# Patient Record
Sex: FEMALE | Race: WHITE | NOT HISPANIC OR LATINO | Employment: FULL TIME | URBAN - METROPOLITAN AREA
[De-identification: names, ages, dates, MRNs, and addresses within clinical notes are randomized per-mention and may not be internally consistent; named-entity substitution may affect disease eponyms.]

---

## 2017-08-18 ENCOUNTER — TELEPHONE (OUTPATIENT)
Dept: GENETICS | Facility: HOSPITAL | Age: 27
End: 2017-08-18

## 2017-10-01 ENCOUNTER — APPOINTMENT (OUTPATIENT)
Dept: GENERAL RADIOLOGY | Facility: HOSPITAL | Age: 27
End: 2017-10-01

## 2017-10-01 ENCOUNTER — HOSPITAL ENCOUNTER (OUTPATIENT)
Facility: HOSPITAL | Age: 27
Setting detail: OBSERVATION
Discharge: HOME | End: 2017-10-03
Attending: EMERGENCY MEDICINE | Admitting: INTERNAL MEDICINE

## 2017-10-01 DIAGNOSIS — E83.39 HYPOPHOSPHATEMIA: ICD-10-CM

## 2017-10-01 DIAGNOSIS — E86.0 DEHYDRATION: Primary | ICD-10-CM

## 2017-10-01 DIAGNOSIS — E87.6 HYPOKALEMIA: ICD-10-CM

## 2017-10-01 DIAGNOSIS — R00.0 TACHYCARDIA: ICD-10-CM

## 2017-10-01 DIAGNOSIS — I95.9 HYPOTENSION, UNSPECIFIED HYPOTENSION TYPE: ICD-10-CM

## 2017-10-01 DIAGNOSIS — E83.51 HYPOCALCEMIA: ICD-10-CM

## 2017-10-01 DIAGNOSIS — E77.8 HYPOPROTEINEMIA (HCC): ICD-10-CM

## 2017-10-01 PROBLEM — R11.10 VOMITING: Status: ACTIVE | Noted: 2017-10-01

## 2017-10-01 PROBLEM — E87.8 ELECTROLYTE ABNORMALITY: Status: ACTIVE | Noted: 2017-10-01

## 2017-10-01 LAB
ALBUMIN SERPL-MCNC: 1.7 G/DL (ref 3.2–4.8)
ALBUMIN/GLOB SERPL: 1.5 G/DL (ref 1.5–2.5)
ALP SERPL-CCNC: 24 U/L (ref 25–100)
ALT SERPL W P-5'-P-CCNC: 6 U/L (ref 7–40)
ANION GAP SERPL CALCULATED.3IONS-SCNC: 7 MMOL/L (ref 3–11)
AST SERPL-CCNC: 15 U/L (ref 0–33)
BACTERIA UR QL AUTO: ABNORMAL /HPF
BASOPHILS # BLD AUTO: 0.02 10*3/MM3 (ref 0–0.2)
BASOPHILS NFR BLD AUTO: 0.2 % (ref 0–1)
BILIRUB SERPL-MCNC: 0.2 MG/DL (ref 0.3–1.2)
BILIRUB UR QL STRIP: ABNORMAL
BUN BLD-MCNC: 13 MG/DL (ref 9–23)
BUN BLDA-MCNC: 13 MG/DL (ref 8–26)
BUN/CREAT SERPL: 32.5 (ref 7–25)
CA-I BLDA-SCNC: 0.93 MMOL/L (ref 1.2–1.32)
CA-I SERPL ISE-MCNC: 1.07 MMOL/L (ref 1.12–1.32)
CALCIUM SPEC-SCNC: 6.6 MG/DL (ref 8.7–10.4)
CHLORIDE BLDA-SCNC: 100 MMOL/L (ref 98–109)
CHLORIDE SERPL-SCNC: 107 MMOL/L (ref 99–109)
CLARITY UR: ABNORMAL
CO2 BLDA-SCNC: 21 MMOL/L (ref 24–29)
CO2 SERPL-SCNC: 23 MMOL/L (ref 20–31)
COLOR UR: YELLOW
CREAT BLD-MCNC: 0.4 MG/DL (ref 0.6–1.3)
CREAT BLDA-MCNC: 0.5 MG/DL (ref 0.6–1.3)
CRP SERPL-MCNC: 0.45 MG/DL (ref 0–1)
D-LACTATE SERPL-SCNC: 1.3 MMOL/L (ref 0.5–2)
DEPRECATED RDW RBC AUTO: 35.1 FL (ref 37–54)
EOSINOPHIL # BLD AUTO: 0.67 10*3/MM3 (ref 0–0.3)
EOSINOPHIL NFR BLD AUTO: 8.2 % (ref 0–3)
ERYTHROCYTE [DISTWIDTH] IN BLOOD BY AUTOMATED COUNT: 12.1 % (ref 11.3–14.5)
FOLATE SERPL-MCNC: 7.08 NG/ML (ref 3.2–20)
GFR SERPL CREATININE-BSD FRML MDRD: >150 ML/MIN/1.73
GLOBULIN UR ELPH-MCNC: 1.1 GM/DL
GLUCOSE BLD-MCNC: 108 MG/DL (ref 70–100)
GLUCOSE BLDC GLUCOMTR-MCNC: 104 MG/DL (ref 70–130)
GLUCOSE UR STRIP-MCNC: NEGATIVE MG/DL
HCT VFR BLD AUTO: 46.1 % (ref 34.5–44)
HCT VFR BLDA CALC: 50 % (ref 38–51)
HGB BLD-MCNC: 15.3 G/DL (ref 11.5–15.5)
HGB BLDA-MCNC: 17 G/DL (ref 12–17)
HGB UR QL STRIP.AUTO: NEGATIVE
HOLD SPECIMEN: NORMAL
HYALINE CASTS UR QL AUTO: ABNORMAL /LPF
IMM GRANULOCYTES # BLD: 0.05 10*3/MM3 (ref 0–0.03)
IMM GRANULOCYTES NFR BLD: 0.6 % (ref 0–0.6)
IRON 24H UR-MRATE: 81 MCG/DL (ref 50–175)
IRON SATN MFR SERPL: 62 % (ref 15–50)
KETONES UR QL STRIP: ABNORMAL
LEUKOCYTE ESTERASE UR QL STRIP.AUTO: NEGATIVE
LIPASE SERPL-CCNC: 43 U/L (ref 6–51)
LYMPHOCYTES # BLD AUTO: 1.88 10*3/MM3 (ref 0.6–4.8)
LYMPHOCYTES NFR BLD AUTO: 23.1 % (ref 24–44)
MAGNESIUM SERPL-MCNC: 1.3 MG/DL (ref 1.3–2.7)
MCH RBC QN AUTO: 26.6 PG (ref 27–31)
MCHC RBC AUTO-ENTMCNC: 33.2 G/DL (ref 32–36)
MCV RBC AUTO: 80 FL (ref 80–99)
MONOCYTES # BLD AUTO: 0.7 10*3/MM3 (ref 0–1)
MONOCYTES NFR BLD AUTO: 8.6 % (ref 0–12)
NEUTROPHILS # BLD AUTO: 4.81 10*3/MM3 (ref 1.5–8.3)
NEUTROPHILS NFR BLD AUTO: 59.3 % (ref 41–71)
NITRITE UR QL STRIP: NEGATIVE
PH UR STRIP.AUTO: 6 [PH] (ref 5–8)
PHOSPHATE SERPL-MCNC: 1.6 MG/DL (ref 2.4–5.1)
PLATELET # BLD AUTO: 299 10*3/MM3 (ref 150–450)
PMV BLD AUTO: 9.7 FL (ref 6–12)
POTASSIUM BLD-SCNC: 3.1 MMOL/L (ref 3.5–5.5)
POTASSIUM BLDA-SCNC: 2.9 MMOL/L (ref 3.5–4.9)
PROT SERPL-MCNC: 2.8 G/DL (ref 5.7–8.2)
PROT UR QL STRIP: NEGATIVE
RBC # BLD AUTO: 5.76 10*6/MM3 (ref 3.89–5.14)
RBC # UR: ABNORMAL /HPF
REF LAB TEST METHOD: ABNORMAL
SODIUM BLD-SCNC: 137 MMOL/L (ref 132–146)
SODIUM BLDA-SCNC: 141 MMOL/L (ref 138–146)
SP GR UR STRIP: 1.02 (ref 1–1.03)
SQUAMOUS #/AREA URNS HPF: ABNORMAL /HPF
TIBC SERPL-MCNC: 131 MCG/DL (ref 250–450)
TROPONIN I SERPL-MCNC: 0.04 NG/ML (ref 0–0.07)
TSH SERPL DL<=0.05 MIU/L-ACNC: 2.05 MIU/ML (ref 0.35–5.35)
UROBILINOGEN UR QL STRIP: ABNORMAL
VIT B12 BLD-MCNC: 409 PG/ML (ref 211–911)
WBC NRBC COR # BLD: 8.13 10*3/MM3 (ref 3.5–10.8)
WBC UR QL AUTO: ABNORMAL /HPF
WHOLE BLOOD HOLD SPECIMEN: NORMAL
WHOLE BLOOD HOLD SPECIMEN: NORMAL

## 2017-10-01 PROCEDURE — 96375 TX/PRO/DX INJ NEW DRUG ADDON: CPT

## 2017-10-01 PROCEDURE — 83605 ASSAY OF LACTIC ACID: CPT | Performed by: EMERGENCY MEDICINE

## 2017-10-01 PROCEDURE — 25010000003 POTASSIUM CHLORIDE 10 MEQ/100ML SOLUTION: Performed by: EMERGENCY MEDICINE

## 2017-10-01 PROCEDURE — 86140 C-REACTIVE PROTEIN: CPT | Performed by: EMERGENCY MEDICINE

## 2017-10-01 PROCEDURE — 25010000002 ONDANSETRON PER 1 MG: Performed by: EMERGENCY MEDICINE

## 2017-10-01 PROCEDURE — 25810000003 SODIUM CHLORIDE 0.9 % WITH KCL 20 MEQ 20-0.9 MEQ/L-% SOLUTION: Performed by: INTERNAL MEDICINE

## 2017-10-01 PROCEDURE — 87040 BLOOD CULTURE FOR BACTERIA: CPT | Performed by: EMERGENCY MEDICINE

## 2017-10-01 PROCEDURE — 83690 ASSAY OF LIPASE: CPT | Performed by: EMERGENCY MEDICINE

## 2017-10-01 PROCEDURE — 81001 URINALYSIS AUTO W/SCOPE: CPT | Performed by: EMERGENCY MEDICINE

## 2017-10-01 PROCEDURE — 85025 COMPLETE CBC W/AUTO DIFF WBC: CPT | Performed by: EMERGENCY MEDICINE

## 2017-10-01 PROCEDURE — 85014 HEMATOCRIT: CPT

## 2017-10-01 PROCEDURE — 99284 EMERGENCY DEPT VISIT MOD MDM: CPT

## 2017-10-01 PROCEDURE — G0378 HOSPITAL OBSERVATION PER HR: HCPCS

## 2017-10-01 PROCEDURE — 84484 ASSAY OF TROPONIN QUANT: CPT

## 2017-10-01 PROCEDURE — 96376 TX/PRO/DX INJ SAME DRUG ADON: CPT

## 2017-10-01 PROCEDURE — 25010000002 ONDANSETRON PER 1 MG: Performed by: INTERNAL MEDICINE

## 2017-10-01 PROCEDURE — 80047 BASIC METABLC PNL IONIZED CA: CPT

## 2017-10-01 PROCEDURE — 96365 THER/PROPH/DIAG IV INF INIT: CPT

## 2017-10-01 PROCEDURE — 99220 PR INITIAL OBSERVATION CARE/DAY 70 MINUTES: CPT | Performed by: INTERNAL MEDICINE

## 2017-10-01 PROCEDURE — 83540 ASSAY OF IRON: CPT | Performed by: INTERNAL MEDICINE

## 2017-10-01 PROCEDURE — 82746 ASSAY OF FOLIC ACID SERUM: CPT | Performed by: INTERNAL MEDICINE

## 2017-10-01 PROCEDURE — 80053 COMPREHEN METABOLIC PANEL: CPT | Performed by: EMERGENCY MEDICINE

## 2017-10-01 PROCEDURE — 82607 VITAMIN B-12: CPT | Performed by: INTERNAL MEDICINE

## 2017-10-01 PROCEDURE — 71010 HC CHEST PA OR AP: CPT

## 2017-10-01 PROCEDURE — 84100 ASSAY OF PHOSPHORUS: CPT | Performed by: EMERGENCY MEDICINE

## 2017-10-01 PROCEDURE — 84443 ASSAY THYROID STIM HORMONE: CPT | Performed by: INTERNAL MEDICINE

## 2017-10-01 PROCEDURE — 93005 ELECTROCARDIOGRAM TRACING: CPT | Performed by: EMERGENCY MEDICINE

## 2017-10-01 PROCEDURE — 83735 ASSAY OF MAGNESIUM: CPT | Performed by: EMERGENCY MEDICINE

## 2017-10-01 PROCEDURE — 82330 ASSAY OF CALCIUM: CPT | Performed by: EMERGENCY MEDICINE

## 2017-10-01 PROCEDURE — 83550 IRON BINDING TEST: CPT | Performed by: INTERNAL MEDICINE

## 2017-10-01 RX ORDER — POTASSIUM CHLORIDE 7.45 MG/ML
10 INJECTION INTRAVENOUS
Status: DISCONTINUED | OUTPATIENT
Start: 2017-10-01 | End: 2017-10-03 | Stop reason: HOSPADM

## 2017-10-01 RX ORDER — ACETAMINOPHEN 325 MG/1
650 TABLET ORAL EVERY 4 HOURS PRN
Status: DISCONTINUED | OUTPATIENT
Start: 2017-10-01 | End: 2017-10-03 | Stop reason: HOSPADM

## 2017-10-01 RX ORDER — FAMOTIDINE 10 MG/ML
20 INJECTION, SOLUTION INTRAVENOUS ONCE
Status: COMPLETED | OUTPATIENT
Start: 2017-10-01 | End: 2017-10-01

## 2017-10-01 RX ORDER — POTASSIUM CHLORIDE 750 MG/1
20 CAPSULE, EXTENDED RELEASE ORAL ONCE
Status: COMPLETED | OUTPATIENT
Start: 2017-10-01 | End: 2017-10-01

## 2017-10-01 RX ORDER — SODIUM CHLORIDE 0.9 % (FLUSH) 0.9 %
10 SYRINGE (ML) INJECTION AS NEEDED
Status: DISCONTINUED | OUTPATIENT
Start: 2017-10-01 | End: 2017-10-03 | Stop reason: HOSPADM

## 2017-10-01 RX ORDER — SODIUM CHLORIDE 0.9 % (FLUSH) 0.9 %
1-10 SYRINGE (ML) INJECTION AS NEEDED
Status: DISCONTINUED | OUTPATIENT
Start: 2017-10-01 | End: 2017-10-03 | Stop reason: HOSPADM

## 2017-10-01 RX ORDER — ONDANSETRON 2 MG/ML
INJECTION INTRAMUSCULAR; INTRAVENOUS
Status: DISPENSED
Start: 2017-10-01 | End: 2017-10-02

## 2017-10-01 RX ORDER — MAGNESIUM SULFATE HEPTAHYDRATE 40 MG/ML
4 INJECTION, SOLUTION INTRAVENOUS AS NEEDED
Status: DISCONTINUED | OUTPATIENT
Start: 2017-10-01 | End: 2017-10-03 | Stop reason: HOSPADM

## 2017-10-01 RX ORDER — ONDANSETRON 2 MG/ML
4 INJECTION INTRAMUSCULAR; INTRAVENOUS EVERY 6 HOURS PRN
Status: DISCONTINUED | OUTPATIENT
Start: 2017-10-01 | End: 2017-10-03 | Stop reason: HOSPADM

## 2017-10-01 RX ORDER — MAGNESIUM SULFATE HEPTAHYDRATE 40 MG/ML
2 INJECTION, SOLUTION INTRAVENOUS AS NEEDED
Status: DISCONTINUED | OUTPATIENT
Start: 2017-10-01 | End: 2017-10-03 | Stop reason: HOSPADM

## 2017-10-01 RX ORDER — POTASSIUM CHLORIDE 7.45 MG/ML
10 INJECTION INTRAVENOUS ONCE
Status: COMPLETED | OUTPATIENT
Start: 2017-10-01 | End: 2017-10-01

## 2017-10-01 RX ORDER — ONDANSETRON 2 MG/ML
4 INJECTION INTRAMUSCULAR; INTRAVENOUS ONCE
Status: COMPLETED | OUTPATIENT
Start: 2017-10-01 | End: 2017-10-01

## 2017-10-01 RX ORDER — POTASSIUM CHLORIDE 750 MG/1
40 CAPSULE, EXTENDED RELEASE ORAL AS NEEDED
Status: DISCONTINUED | OUTPATIENT
Start: 2017-10-01 | End: 2017-10-03 | Stop reason: HOSPADM

## 2017-10-01 RX ORDER — SODIUM CHLORIDE AND POTASSIUM CHLORIDE 150; 900 MG/100ML; MG/100ML
125 INJECTION, SOLUTION INTRAVENOUS CONTINUOUS
Status: DISCONTINUED | OUTPATIENT
Start: 2017-10-01 | End: 2017-10-03 | Stop reason: HOSPADM

## 2017-10-01 RX ORDER — SODIUM CHLORIDE 9 MG/ML
250 INJECTION, SOLUTION INTRAVENOUS CONTINUOUS
Status: DISCONTINUED | OUTPATIENT
Start: 2017-10-01 | End: 2017-10-02

## 2017-10-01 RX ORDER — POTASSIUM CHLORIDE 1.5 G/1.77G
40 POWDER, FOR SOLUTION ORAL AS NEEDED
Status: DISCONTINUED | OUTPATIENT
Start: 2017-10-01 | End: 2017-10-03 | Stop reason: HOSPADM

## 2017-10-01 RX ADMIN — POTASSIUM CHLORIDE 10 MEQ: 7.46 INJECTION, SOLUTION INTRAVENOUS at 18:54

## 2017-10-01 RX ADMIN — ONDANSETRON 4 MG: 2 INJECTION INTRAMUSCULAR; INTRAVENOUS at 23:06

## 2017-10-01 RX ADMIN — SODIUM CHLORIDE 250 ML/HR: 9 INJECTION, SOLUTION INTRAVENOUS at 19:27

## 2017-10-01 RX ADMIN — POTASSIUM CHLORIDE 20 MEQ: 750 CAPSULE, EXTENDED RELEASE ORAL at 21:04

## 2017-10-01 RX ADMIN — ONDANSETRON 4 MG: 2 INJECTION INTRAMUSCULAR; INTRAVENOUS at 18:44

## 2017-10-01 RX ADMIN — FAMOTIDINE 20 MG: 10 INJECTION, SOLUTION INTRAVENOUS at 18:47

## 2017-10-01 RX ADMIN — POTASSIUM CHLORIDE AND SODIUM CHLORIDE 125 ML/HR: 900; 150 INJECTION, SOLUTION INTRAVENOUS at 23:35

## 2017-10-01 RX ADMIN — SODIUM CHLORIDE 1000 ML: 9 INJECTION, SOLUTION INTRAVENOUS at 18:32

## 2017-10-01 RX ADMIN — SODIUM CHLORIDE 1000 ML: 9 INJECTION, SOLUTION INTRAVENOUS at 18:48

## 2017-10-01 NOTE — ED PROVIDER NOTES
Subjective   HPI Comments: Saritha Simental is a 27 y.o.female who presents to the ED with c/o generalized illness with onset 4 days ago. She reports experiencing dehydration, fatigue, decreased appetite and inability to drink fluids, rapid heart rate, dizziness when she stands up, diarrhea 4 days ago, nausea, vomiting, HA, fevers, weight loss, and tingling all over which onset 1 hours PTA.  She states that she was seen at the ED in the past couple of days for these symptoms where they found that her creatinine and BUN were elevated and she was given 3 L of fluid. The patient notes that she told the doctors at the ED that she felt better, but she didn't actually feel improved. She denies dysuria, increased urgency or frequency, cough, congestion, sore throat, rhinorrhea, consumption of bad food, or any other complaints at this time. Additionally, she denies any history of cardiac or lung problems, smoking, drinking, drug use, DM, or any known sick contacts. She see's Dr. Zamora for primary care.    Patient is a 27 y.o. female presenting with general illness.   History provided by:  Patient  Illness   Quality:  Dehydration, diarrhea, fatigue, dizziness, decreased appetite, nausea, vomiting, HA, fevers, weight loss, and tingling  Severity:  Moderate  Onset quality:  Sudden  Duration: 4 days.  Timing:  Constant  Progression:  Unchanged  Chronicity:  New  Context:  She states that she was seen at the ED in the past couple of days for these symptoms where they found that her creatinine and BUN were elevated and she was given 3 L of fluid. The patient notes that she told the doctors at the ED that she felt better, but  Relieved by:  Fluids  Worsened by:  Nothing  Ineffective treatments:  Nothing  Associated symptoms: diarrhea, fatigue, headaches, nausea and vomiting    Associated symptoms: no congestion, no cough, no rhinorrhea and no sore throat        Review of Systems   Constitutional: Positive for appetite change  "(decreased and inability to drink fluids), fatigue and unexpected weight change (Weight loss).        Dehydrated. \"Tingling\" all over.   HENT: Negative for congestion, rhinorrhea and sore throat.    Respiratory: Negative for cough.    Cardiovascular:        Rapid heart rate   Gastrointestinal: Positive for diarrhea, nausea and vomiting.   Genitourinary: Negative for dysuria, frequency and urgency.   Neurological: Positive for dizziness and headaches.   All other systems reviewed and are negative.      Past Medical History:   Diagnosis Date   • Abnormal Pap smear of cervix     \"A few years ago for HPV but went away\"   • Scoliosis    • Tethered spinal cord     Pt is unsure if she will be able to get an epidural       No Known Allergies    Past Surgical History:   Procedure Laterality Date   •  SECTION     • EAR TUBES Bilateral     \"When I was 6\"   • TONSILLECTOMY      \"When I was 6\"   • WISDOM TOOTH EXTRACTION         Family History   Problem Relation Age of Onset   • Nephrolithiasis Father    • Diabetes Father    • Hypertension Father        Social History     Social History   • Marital status:      Spouse name: N/A   • Number of children: N/A   • Years of education: N/A     Social History Main Topics   • Smoking status: Never Smoker   • Smokeless tobacco: Never Used   • Alcohol use No   • Drug use: No   • Sexual activity: Defer     Other Topics Concern   • None     Social History Narrative    Lives with her          Objective   Physical Exam   Constitutional: She is oriented to person, place, and time. She appears well-developed and well-nourished. No distress.   HENT:   Head: Normocephalic and atraumatic.   Nose: Nose normal.   Eyes: No scleral icterus.   Conjunctivae is slightly pallor.   Neck: Normal range of motion. Neck supple.   Cardiovascular: Regular rhythm and normal heart sounds.  Tachycardia present.  Exam reveals no gallop and no friction rub.    No murmur heard.  Pulmonary/Chest: " Effort normal and breath sounds normal. No respiratory distress.   Abdominal: Soft. Bowel sounds are normal. She exhibits no distension. There is no tenderness.   Negative murphys sign.   Musculoskeletal: Normal range of motion. She exhibits no edema.   Lymphadenopathy:     She has no cervical adenopathy.   Neurological: She is alert and oriented to person, place, and time.   Skin: Skin is warm and dry.   Psychiatric: She has a normal mood and affect. Her behavior is normal.   Nursing note and vitals reviewed.      Procedures         ED Course  ED Course   Comment By Time   Patient is serially reevaluated throughout the ED course.  She is treated with a 2 L bolus of IV fluids with market improvement of her heart rate with decreased to the 110 range and subsequently down into the 90s.  She remained hypotensive however and had repeated fluid.  She had marked electrolyte abnormalities, but additionally had a markedly depressed albumin and total protein.I discussed the findings with patient and spouse.  She is able take only sips of fluids, but is unable to do more than that even after vigorous hydration in the emergency departmentDiscussed case with Dr. Mckay will admit for definitive inpatient care Asaf Little MD 10/02 1166     Recent Results (from the past 24 hour(s))   POC CHEM 8    Collection Time: 10/01/17  6:30 PM   Result Value Ref Range    Glucose 104 70 - 130 mg/dL    BUN, Arterial 13 8 - 26 mg/dL    Creatinine 0.50 (L) 0.60 - 1.30 mg/dL    Sodium 141 138 - 146 mmol/L    Potassium 2.9 (L) 3.5 - 4.9 mmol/L    Chloride 100 98 - 109 mmol/L    Total CO2 21 (L) 24 - 29 mmol/L    Hemoglobin 17.0 12.0 - 17.0 g/dL    Hematocrit 50 38 - 51 %    Ionized Calcium 0.93 (L) 1.20 - 1.32 mmol/L   Comprehensive Metabolic Panel    Collection Time: 10/01/17  7:01 PM   Result Value Ref Range    Glucose 108 (H) 70 - 100 mg/dL    BUN 13 9 - 23 mg/dL    Creatinine 0.40 (L) 0.60 - 1.30 mg/dL    Sodium 137 132 - 146 mmol/L     Potassium 3.1 (L) 3.5 - 5.5 mmol/L    Chloride 107 99 - 109 mmol/L    CO2 23.0 20.0 - 31.0 mmol/L    Calcium 6.6 (L) 8.7 - 10.4 mg/dL    Total Protein 2.8 (L) 5.7 - 8.2 g/dL    Albumin 1.70 (L) 3.20 - 4.80 g/dL    ALT (SGPT) 6 (L) 7 - 40 U/L    AST (SGOT) 15 0 - 33 U/L    Alkaline Phosphatase 24 (L) 25 - 100 U/L    Total Bilirubin 0.2 (L) 0.3 - 1.2 mg/dL    eGFR Non African Amer >150 >60 mL/min/1.73    Globulin 1.1 gm/dL    A/G Ratio 1.5 1.5 - 2.5 g/dL    BUN/Creatinine Ratio 32.5 (H) 7.0 - 25.0    Anion Gap 7.0 3.0 - 11.0 mmol/L   Lipase    Collection Time: 10/01/17  7:01 PM   Result Value Ref Range    Lipase 43 6 - 51 U/L   CBC Auto Differential    Collection Time: 10/01/17  7:01 PM   Result Value Ref Range    WBC 8.13 3.50 - 10.80 10*3/mm3    RBC 5.76 (H) 3.89 - 5.14 10*6/mm3    Hemoglobin 15.3 11.5 - 15.5 g/dL    Hematocrit 46.1 (H) 34.5 - 44.0 %    MCV 80.0 80.0 - 99.0 fL    MCH 26.6 (L) 27.0 - 31.0 pg    MCHC 33.2 32.0 - 36.0 g/dL    RDW 12.1 11.3 - 14.5 %    RDW-SD 35.1 (L) 37.0 - 54.0 fl    MPV 9.7 6.0 - 12.0 fL    Platelets 299 150 - 450 10*3/mm3    Neutrophil % 59.3 41.0 - 71.0 %    Lymphocyte % 23.1 (L) 24.0 - 44.0 %    Monocyte % 8.6 0.0 - 12.0 %    Eosinophil % 8.2 (H) 0.0 - 3.0 %    Basophil % 0.2 0.0 - 1.0 %    Immature Grans % 0.6 0.0 - 0.6 %    Neutrophils, Absolute 4.81 1.50 - 8.30 10*3/mm3    Lymphocytes, Absolute 1.88 0.60 - 4.80 10*3/mm3    Monocytes, Absolute 0.70 0.00 - 1.00 10*3/mm3    Eosinophils, Absolute 0.67 (H) 0.00 - 0.30 10*3/mm3    Basophils, Absolute 0.02 0.00 - 0.20 10*3/mm3    Immature Grans, Absolute 0.05 (H) 0.00 - 0.03 10*3/mm3   Magnesium    Collection Time: 10/01/17  7:01 PM   Result Value Ref Range    Magnesium 1.3 1.3 - 2.7 mg/dL   Phosphorus    Collection Time: 10/01/17  7:01 PM   Result Value Ref Range    Phosphorus 1.6 (L) 2.4 - 5.1 mg/dL   Calcium, Ionized    Collection Time: 10/01/17  7:01 PM   Result Value Ref Range    Ionized Calcium 1.07 (L) 1.12 - 1.32 mmol/L    Lactic Acid, Plasma    Collection Time: 10/01/17  7:01 PM   Result Value Ref Range    Lactate 1.3 0.5 - 2.0 mmol/L   C-reactive Protein    Collection Time: 10/01/17  7:01 PM   Result Value Ref Range    C-Reactive Protein 0.45 0.00 - 1.00 mg/dL   Light Blue Top    Collection Time: 10/01/17  7:01 PM   Result Value Ref Range    Extra Tube hold for add-on    Green Top (Gel)    Collection Time: 10/01/17  7:01 PM   Result Value Ref Range    Extra Tube Hold for add-ons.    Lavender Top    Collection Time: 10/01/17  7:01 PM   Result Value Ref Range    Extra Tube hold for add-on    TSH    Collection Time: 10/01/17  7:01 PM   Result Value Ref Range    TSH 2.047 0.350 - 5.350 mIU/mL   Iron Profile    Collection Time: 10/01/17  7:01 PM   Result Value Ref Range    Iron 81 50 - 175 mcg/dL    TIBC 131 (L) 250 - 450 mcg/dL    Iron Saturation 62 (H) 15 - 50 %   Folate    Collection Time: 10/01/17  7:01 PM   Result Value Ref Range    Folate 7.08 3.20 - 20.00 ng/mL   Vitamin B12    Collection Time: 10/01/17  7:01 PM   Result Value Ref Range    Vitamin B-12 409 211 - 911 pg/mL   POC Troponin, Rapid    Collection Time: 10/01/17  7:05 PM   Result Value Ref Range    Troponin I 0.04 0.00 - 0.07 ng/mL   Urinalysis With / Culture If Indicated - Urine, Clean Catch    Collection Time: 10/01/17 10:55 PM   Result Value Ref Range    Color, UA Yellow Yellow, Straw    Appearance, UA Cloudy (A) Clear    pH, UA 6.0 5.0 - 8.0    Specific Gravity, UA 1.021 1.001 - 1.030    Glucose, UA Negative Negative    Ketones, UA >=160 mg/dL (4+) (A) Negative    Bilirubin, UA Moderate (2+) (A) Negative    Blood, UA Negative Negative    Protein, UA Negative Negative    Leuk Esterase, UA Negative Negative    Nitrite, UA Negative Negative    Urobilinogen, UA 1.0 E.U./dL 0.2 - 1.0 E.U./dL   Urinalysis, Microscopic Only - Urine, Clean Catch    Collection Time: 10/01/17 10:55 PM   Result Value Ref Range    RBC, UA 0-2 None Seen, 0-2 /HPF    WBC, UA 3-5 (A) None Seen  "/HPF    Bacteria, UA None Seen None Seen, Trace /HPF    Squamous Epithelial Cells, UA 3-6 (A) None Seen, 0-2 /HPF    Hyaline Casts, UA 0-6 0 - 6 /LPF    Methodology Manual Light Microscopy    Potassium    Collection Time: 10/02/17 12:28 AM   Result Value Ref Range    Potassium 3.7 3.5 - 5.5 mmol/L     Note: In addition to lab results from this visit, the labs listed above may include labs taken at another facility or during a different encounter within the last 24 hours. Please correlate lab times with ED admission and discharge times for further clarification of the services performed during this visit.    XR Chest 1 View   Final Result   Abnormal      1. No acute findings.      2. Non-acute findings are described above.      THIS DOCUMENT HAS BEEN ELECTRONICALLY SIGNED BY DOMINIC DUMONT MD        Vitals:    10/01/17 2020 10/01/17 2050 10/01/17 2215 10/02/17 0425   BP: 93/63 103/70  106/70   BP Location:   Right arm Right arm   Patient Position:   Lying Lying   Pulse: 99  87 83   Resp:   16    Temp:   97.9 °F (36.6 °C)    TempSrc:   Oral    SpO2: 100%  100% 96%   Weight:   110 lb (49.9 kg)    Height:   64\" (162.6 cm)      Medications   sodium chloride 0.9 % flush 10 mL (not administered)   sodium chloride 0.9 % flush 10 mL (not administered)   sodium chloride 0.9 % infusion (0 mL/hr Intravenous Stopped 10/1/17 3321)   sodium chloride 0.9 % flush 1-10 mL (not administered)   sodium chloride 0.9 % with KCl 20 mEq/L infusion (125 mL/hr Intravenous Restarted 10/2/17 0135)   acetaminophen (TYLENOL) tablet 650 mg (not administered)   potassium chloride (MICRO-K) CR capsule 40 mEq (not administered)     Or   potassium chloride (KLOR-CON) packet 40 mEq (not administered)     Or   potassium chloride 10 mEq in 100 mL IVPB (not administered)   Magnesium Sulfate 2 gram Bolus, followed by 8 gram infusion (total Mg dose 10 grams)- Mg less than or equal to 1mg/dL (not administered)     Or   Magnesium Sulfate 6 gram Infusion (2 gm x " 3) -Mg 1.1 -1.5 mg/dL (not administered)     Or   magnesium sulfate 4 gram infusion- Mg 1.6-1.9 mg/dL (not administered)   calcium gluconate 1 g in sodium chloride 0.9 % 100 mL IVPB (not administered)     And   calcium gluconate 2 g in sodium chloride 0.9 % 500 mL IVPB (not administered)   ondansetron (ZOFRAN) injection 4 mg (4 mg Intravenous Given 10/1/17 2306)   sodium chloride 0.9 % bolus 1,000 mL (0 mL Intravenous Stopped 10/1/17 1917)   sodium chloride 0.9 % bolus 1,000 mL (0 mL Intravenous Stopped 10/1/17 1917)   famotidine (PEPCID) injection 20 mg (20 mg Intravenous Given 10/1/17 1847)   ondansetron (ZOFRAN) injection 4 mg ( Intravenous Canceled Entry 10/1/17 2249)   potassium chloride 10 mEq in 100 mL IVPB (0 mEq Intravenous Stopped 10/1/17 2000)   potassium chloride (MICRO-K) CR capsule 20 mEq (20 mEq Oral Given 10/1/17 2104)     ECG/EMG Results (last 24 hours)     Procedure Component Value Units Date/Time    ECG 12 Lead [[id number]] Collected:  10/01/17 1829     Updated:  10/01/17 1830                        MDM    Final diagnoses:   Dehydration   Tachycardia   Hypokalemia   Hypophosphatemia   Hypocalcemia   Hypoproteinemia   Hypotension, unspecified hypotension type       Documentation assistance provided by opal Rizvi.  Information recorded by the scribe was done at my direction and has been verified and validated by me.     Carey Rizvi  10/01/17 1938       Carey Rizvi  10/01/17 2023       Carey Rizvi  10/01/17 2118       Asaf Little MD  10/02/17 6681

## 2017-10-02 LAB
ANION GAP SERPL CALCULATED.3IONS-SCNC: 4 MMOL/L (ref 3–11)
BUN BLD-MCNC: 10 MG/DL (ref 9–23)
BUN/CREAT SERPL: 25 (ref 7–25)
CA-I SERPL ISE-MCNC: 1.19 MMOL/L (ref 1.12–1.32)
CALCIUM SPEC-SCNC: 7.2 MG/DL (ref 8.7–10.4)
CHLORIDE SERPL-SCNC: 109 MMOL/L (ref 99–109)
CO2 SERPL-SCNC: 25 MMOL/L (ref 20–31)
CREAT BLD-MCNC: 0.4 MG/DL (ref 0.6–1.3)
DEPRECATED RDW RBC AUTO: 37.1 FL (ref 37–54)
ERYTHROCYTE [DISTWIDTH] IN BLOOD BY AUTOMATED COUNT: 12.6 % (ref 11.3–14.5)
GFR SERPL CREATININE-BSD FRML MDRD: >150 ML/MIN/1.73
GLUCOSE BLD-MCNC: 84 MG/DL (ref 70–100)
HCT VFR BLD AUTO: 43.4 % (ref 34.5–44)
HGB BLD-MCNC: 14.4 G/DL (ref 11.5–15.5)
MCH RBC QN AUTO: 26.9 PG (ref 27–31)
MCHC RBC AUTO-ENTMCNC: 33.2 G/DL (ref 32–36)
MCV RBC AUTO: 81.1 FL (ref 80–99)
PLATELET # BLD AUTO: 316 10*3/MM3 (ref 150–450)
PMV BLD AUTO: 9.5 FL (ref 6–12)
POTASSIUM BLD-SCNC: 3.7 MMOL/L (ref 3.5–5.5)
POTASSIUM BLD-SCNC: 4 MMOL/L (ref 3.5–5.5)
RBC # BLD AUTO: 5.35 10*6/MM3 (ref 3.89–5.14)
SODIUM BLD-SCNC: 138 MMOL/L (ref 132–146)
WBC NRBC COR # BLD: 8.5 10*3/MM3 (ref 3.5–10.8)

## 2017-10-02 PROCEDURE — 82330 ASSAY OF CALCIUM: CPT | Performed by: INTERNAL MEDICINE

## 2017-10-02 PROCEDURE — 96376 TX/PRO/DX INJ SAME DRUG ADON: CPT

## 2017-10-02 PROCEDURE — 25010000002 ONDANSETRON PER 1 MG: Performed by: INTERNAL MEDICINE

## 2017-10-02 PROCEDURE — 85027 COMPLETE CBC AUTOMATED: CPT | Performed by: INTERNAL MEDICINE

## 2017-10-02 PROCEDURE — G0378 HOSPITAL OBSERVATION PER HR: HCPCS

## 2017-10-02 PROCEDURE — 25810000003 SODIUM CHLORIDE 0.9 % WITH KCL 20 MEQ 20-0.9 MEQ/L-% SOLUTION: Performed by: INTERNAL MEDICINE

## 2017-10-02 PROCEDURE — 80048 BASIC METABOLIC PNL TOTAL CA: CPT | Performed by: INTERNAL MEDICINE

## 2017-10-02 PROCEDURE — 99225 PR SBSQ OBSERVATION CARE/DAY 25 MINUTES: CPT | Performed by: INTERNAL MEDICINE

## 2017-10-02 PROCEDURE — 84132 ASSAY OF SERUM POTASSIUM: CPT | Performed by: INTERNAL MEDICINE

## 2017-10-02 RX ORDER — PANTOPRAZOLE SODIUM 40 MG/1
40 TABLET, DELAYED RELEASE ORAL
Status: DISCONTINUED | OUTPATIENT
Start: 2017-10-03 | End: 2017-10-03 | Stop reason: HOSPADM

## 2017-10-02 RX ORDER — CALCIUM CARBONATE 200(500)MG
1 TABLET,CHEWABLE ORAL 3 TIMES DAILY PRN
Status: DISCONTINUED | OUTPATIENT
Start: 2017-10-02 | End: 2017-10-03 | Stop reason: HOSPADM

## 2017-10-02 RX ADMIN — ONDANSETRON 4 MG: 2 INJECTION INTRAMUSCULAR; INTRAVENOUS at 06:02

## 2017-10-02 RX ADMIN — ONDANSETRON 4 MG: 2 INJECTION INTRAMUSCULAR; INTRAVENOUS at 19:21

## 2017-10-02 RX ADMIN — POTASSIUM CHLORIDE AND SODIUM CHLORIDE 125 ML/HR: 900; 150 INJECTION, SOLUTION INTRAVENOUS at 10:42

## 2017-10-02 RX ADMIN — CALCIUM CARBONATE 1 TABLET: 500 TABLET ORAL at 17:53

## 2017-10-02 RX ADMIN — POTASSIUM CHLORIDE AND SODIUM CHLORIDE 125 ML/HR: 900; 150 INJECTION, SOLUTION INTRAVENOUS at 19:22

## 2017-10-02 RX ADMIN — POTASSIUM CHLORIDE AND SODIUM CHLORIDE 125 ML/HR: 900; 150 INJECTION, SOLUTION INTRAVENOUS at 06:03

## 2017-10-02 NOTE — PLAN OF CARE
Problem: Patient Care Overview (Adult)  Goal: Plan of Care Review  Outcome: Ongoing (interventions implemented as appropriate)    10/01/17 5742   Coping/Psychosocial Response Interventions   Plan Of Care Reviewed With patient   Patient Care Overview   Progress progress toward functional goals as expected   Outcome Evaluation   Outcome Summary/Follow up Plan Pt admitted to the floor at this time. Complains of lightheadedness and some nausea.

## 2017-10-02 NOTE — PLAN OF CARE
Problem: Sepsis (Adult)  Goal: Signs and Symptoms of Listed Potential Problems Will be Absent or Manageable (Sepsis)  Outcome: Ongoing (interventions implemented as appropriate)    10/01/17 6828   Sepsis   Problems Assessed (Sepsis) all   Problems Present (Sepsis) situational response

## 2017-10-02 NOTE — PROGRESS NOTES
Hospitalist Daily Progress Note    Date of Admission: 10/1/2017   LOS: 0 days   PCP: Ramon Zamora MD    Chief Complaint: N/V    Subjective    Feels better.  Vomiting has subsided and nausea has improved significantly.  Patient has had a few bites of gelatin and she is handling it well.  Denies any fever or chills.  Denies any diarrhea or abdominal pain.  History of Present Illness    Review of Systems  General: no fevers, chills  Respiratory: no cough, dyspnea  Cardiovascular: no chest pain, palpitations  Abdomen: No abdominal pain, nausea, vomiting, or diarrhea  Neurologic: No focal weakness    Objective   Physical Exam:  I have reviewed the vital signs.  Temp:  [97.7 °F (36.5 °C)-98.3 °F (36.8 °C)] 98.3 °F (36.8 °C)  Heart Rate:  [] 80  Resp:  [16-20] 18  BP: ()/(63-75) 96/66    Objective  General Appearance:    Alert, cooperative, no distress  Head:    Normocephalic, atraumatic  Eyes:    Sclerae anicteric  Neck:   Supple, no mass  Lungs: Clear to auscultation bilaterally, respirations unlabored  Heart: Regular rate and rhythm, S1 and S2 normal, no murmur, rub or gallop  Abdomen:  Soft, non-tender, bowel sounds active, nondistended  Extremities: No clubbing, cyanosis, or edema to lower extremities  Skin: No rashes   Neurologic: Oriented x3, Normal strength to extremities    Results Review:    I have reviewed the labs, radiology results and diagnostic studies.      Results from last 7 days  Lab Units 10/02/17  0635   WBC 10*3/mm3 8.50   HEMOGLOBIN g/dL 14.4   PLATELETS 10*3/mm3 316       Results from last 7 days  Lab Units 10/02/17  0635   SODIUM mmol/L 138   POTASSIUM mmol/L 4.0   CO2 mmol/L 25.0   CREATININE mg/dL 0.40*   GLUCOSE mg/dL 84       I have reviewed the medications.    ---------------------------------------------------------------------------------------------  Assessment/Plan   Assessment & Plan  Assessment/Problem List    Principal Problem:    Electrolyte abnormality, being  replaced.      Vomiting, subsided.  Patient tolerating clear liquid diet.      Tachycardia      Dehydration, IV fluid.       Plan  - Continue current care.  - Advance diet.  - Labs in a.m. and check electrolytes  - Home in a.m. if labs acceptable and patient stable.        DVT prophylaxis:  Discharge Planning: I expect patient to be discharged to home in 1-2 days.    Sachin Del Toro MD 10/02/17 2:51 PM

## 2017-10-02 NOTE — H&P
"      HOSPITAL MEDICINE HISTORY AND PHYSICAL    PCP: Ramon Zamora MD    Chief Complaint: n/v, weakness    Subjective     History of Present Illness   27 year old female presenting from home with weakness and dizziness. Patient developed symptoms consistent with viral gastroenteritis approximately 4 days ago and was seen in outside ED and given fluids approximately 3 days ago. She told the doc there she felt better even though she didn't just to \"make them feel better.\" Her diarrhea has improved, but she is still having nausea and vomiting with inability to tolerate PO intake. She felt worse today and was essentially to weak to walk which is shy she came into the ED today.     Review of Systems   Otherwise complete ROS is negative except as mentioned in the HPI.    Past Medical History:   Past Medical History:   Diagnosis Date   • Abnormal Pap smear of cervix     \"A few years ago for HPV but went away\"   • Scoliosis    • Tethered spinal cord     Pt is unsure if she will be able to get an epidural       Past Surgical History:  Past Surgical History:   Procedure Laterality Date   •  SECTION     • EAR TUBES Bilateral     \"When I was 6\"   • TONSILLECTOMY      \"When I was 6\"   • WISDOM TOOTH EXTRACTION         Family History: family history includes Diabetes in her father; Hypertension in her father; Nephrolithiasis in her father.     Social History:  reports that she has never smoked. She has never used smokeless tobacco. She reports that she does not drink alcohol or use illicit drugs.    Medications:    (Not in a hospital admission)  No Known Allergies    Objective    Physical Exam:   Vital Signs: /70  Pulse 99  Temp 97.7 °F (36.5 °C)  Resp 20  Ht 64\" (162.6 cm)  Wt 110 lb (49.9 kg)  LMP 2015  SpO2 100%  BMI 18.88 kg/m2  Physical Exam  Gen-no acute distress, comfortable, well appearing  HEENT-atraumatic, normocephalic, PERRLA, EOMI, dry mm  CV-tachy, regular, no murmur  Resp-CTAB, no " wheezes or rales; normal respiratory effort  Abd-soft, NT, ND, +BS; no rebound or guarding  Ext-no edema or clubbing  Skin-warm, dry, no rashes or lesions noted  Neuro-A&Ox3, CN II-XII grossly intact; equal strength in upper and lower extremities; no focal deficits  Psych-appropriate mood and behavior    Results Reviewed:  I have personally reviewed current lab, radiology, and data and agree.    Results from last 7 days  Lab Units 10/01/17  1901   WBC 10*3/mm3 8.13   HEMOGLOBIN g/dL 15.3   PLATELETS 10*3/mm3 299       Results from last 7 days  Lab Units 10/01/17  1901   SODIUM mmol/L 137   POTASSIUM mmol/L 3.1*   CO2 mmol/L 23.0   CREATININE mg/dL 0.40*   GLUCOSE mg/dL 108*   CALCIUM mg/dL 6.6*     CXR personally reviewed without acute chest disease. Agree with interpretation.    EKG: Sinus tachycardia      Assessment/Plan   Assessment & Plan  Principal Problem:    Electrolyte abnormality  Active Problems:    Vomiting    Tachycardia    Dehydration    27 year old female presenting from home with several days of viral illness with inability to tolerate PO intake and multiple electrolyte abnormalities.    Plan:  --Supportive care with IVF and IV antiemetics for presumed viral gastroenteritis. Replace electrolytes prn.  --Tachycardia on arrival was sinus tach into 160's. Improved with IVF and likely just do to volume depletion.  --Also appears to have poor nutrition based on total protein and albumin. She is a vegetarian and likely does not take in adequate protein. Check vitamin stores and ask nutrition to see her while she is here.   --Labs in am.    I believe this patient meets OBSERVATION status, however if further evaluation or treatment plans warrant, status may change.  Based upon current information, I predict patient's care encounter to be less than or equal to 2 midnights.    Farheen Mckay II, DO   10/01/17   9:10 PM

## 2017-10-02 NOTE — PROGRESS NOTES
Discharge Planning Assessment  [facility]     Patient Name: Saritha Simental  MRN: [id number]  Today's Date: 10/2/2017    Admit Date: 10/1/2017          Discharge Needs Assessment       10/02/17 1537    Living Environment    Lives With child(remedios), dependent;spouse    Living Arrangements house    Home Accessibility no concerns    Stair Railings at Home none    Type of Financial/Environmental Concern none    Transportation Available car;family or friend will provide    Living Environment    Provides Primary Care For child(remedios)    Primary Care Provided By spouse/significant other    Quality Of Family Relationships supportive;helpful;involved    Able to Return to Prior Living Arrangements yes    Discharge Needs Assessment    Concerns To Be Addressed no discharge needs identified;denies needs/concerns at this time    Readmission Within The Last 30 Days no previous admission in last 30 days    Anticipated Changes Related to Illness none    Equipment Currently Used at Home none    Equipment Needed After Discharge none    Discharge Disposition home or self-care            Discharge Plan       10/02/17 153    Case Management/Social Work Plan    Plan Home     Patient/Family In Agreement With Plan yes    Additional Comments Patient lives with her spouse in [facility]. She is completely independent with her self-care. She does not use or need any DME and denies any needs or concerns for discharge. Her goal is to return home when medically ready - Shelley Bowling rn/cm [phone]         Discharge Placement     No information found                Demographic Summary       10/02/17 1536    Referral Information    Admission Type observation    Referral Source admission list    Reason For Consult discharge planning    Record Reviewed history and physical;medical record    Contact Information    Permission Granted to Share Information With     Primary Care Physician Information    Name Ramon Zamora              Functional Status       10/02/17 1537    Functional Status Current    Current Functional Level Comment Please see nurses notes     Functional Status Prior    Ambulation 0-->independent    Transferring 0-->independent    Toileting 0-->independent    Bathing 0-->independent    Dressing 0-->independent    Eating 0-->independent    Communication 0-->understands/communicates without difficulty    Swallowing 0-->swallows foods/liquids without difficulty    IADL    Medications independent    Meal Preparation independent    Housekeeping independent    Laundry independent    Shopping independent    Oral Care independent    Activity Tolerance    Current Activity Limitations none    Usual Activity Tolerance good    Current Activity Tolerance good    Cognitive/Perceptual/Developmental    Current Mental Status/Cognitive Functioning no deficits noted    Employment/Financial    Employment/Finance Comments [employer]            Psychosocial     None            Abuse/Neglect     None            Legal     None            Substance Abuse     None            Patient Forms     None          Shelley Bowling RN

## 2017-10-02 NOTE — CONSULTS
Adult Nutrition  Assessment/PES    Patient Name:  Saritha Simental  YOB: 1990  MRN: [id number]  Admit Date:  10/1/2017    Assessment Date:  10/2/2017        Reason for Assessment       10/02/17 1552    Reason for Assessment    Reason For Assessment/Visit physician consult;nutrition order    Identified At Risk By Screening Criteria need for education;other (see comments)   Vegan noted by nursing    Time Spent (min) 30    Fluid Status Dehydration    Metabolic/ICU Other (comment)   Electrolyte abnormality              Nutrition/Diet History       10/02/17 1555    Nutrition/Diet History    Patient Reported Diet/Restrictions/Preferences vegetarian    Reported/Observed By Patient    Appetite Poor Due to GI Factor;Hungry   Pt states her nausea/vomiting has subsided and she feels cautiously hungry    Reported GI Symptoms Nausea and vomiting    Other Pt reports she primarily follows a vegetarian diet but she does allow for dairy and occasionally will eat small amounts of meat.  States she is just particular about where her meat comes from and how it is prepared.             Anthropometrics       10/02/17 1558    Anthropometrics    RD Documented Weight on Admission 49.9 kg (110 lb)   Stated weight on admission. 10/1            Labs/Tests/Procedures/Meds       10/02/17 1558    Labs/Tests/Procedures/Meds    Labs/Tests Review Reviewed;K+;Ion Ca++;Alb;Phos    Medication Review Reviewed, pertinent;Other (comment)   NOte Mg+, Ca+, K+ replacement                Nutrition Prescription Ordered       10/02/17 1600    Nutrition Prescription PO    Current PO Diet Regular    Common Modifiers Vegetarian    Vegetarian Details Vegan: no animal products whatsoever            Evaluation of Received Nutrient/Fluid Intake       10/02/17 1600    PO Evaluation    Number of Days PO Intake Evaluated Insufficient Data              Problem/Interventions:        Problem 1       10/02/17 1602    Nutrition Diagnoses Problem 1    Etiology  (related to) Medical Diagnosis    Metabolic/ICU Other (comment)   Electrolyte abnormalities/ dehydration      10/02/17 1600    Nutrition Diagnoses Problem 1    Problem 1 Inadequate Intake/Infusion    Inadequate Intake Type Oral    Macronutrient Protein    Micronutrient Magnesium;Phosphorous;Potassium;Calcium    Signs/Symptoms (evidenced by) Biochemical    Specific Labs Noted Ion Ca++;K+;Mg++;Na+;Albumin            Problem 2       10/02/17 1603    Nutrition Diagnoses Problem 2    Problem 2 Inadequate Nutrient Intake    Etiology (related to) Factors Affecting Nutrition    Reported/Observed By Patient    Food Habit/Preferences Other   Vegetarian diet, unsure of nutrient rich food choices.     Signs/Symptoms (evidenced by) Reported Information Deficit                  Intervention Goal       10/02/17 1605    Intervention Goal    General Nutrition support treatment    PO Establish PO;Tolerate PO            Nutrition Intervention       10/02/17 1605    Nutrition Intervention    RD/Tech Action Advise alternate selection;Encourage intake;Interview for preference;Menu adjusted;Care plan reviewd;Follow Tx progress            Nutrition Prescription       10/02/17 1605    Nutrition Prescription PO    PO Prescription Begin/change diet    Begin/Change Diet to Regular    Common Modifiers Vegetarian    Vegetarian Details Lacto: allows dairy products    New PO Prescription Ordered? Yes            Education/Evaluation       10/02/17 1605    Education    Education Will Instruct as appropriate   Discussed with pt and spouse, will continue with education in am per request.    Monitor/Evaluation    Monitor Per protocol;PO intake;Symptoms;Pertinent labs        Comments:      Electronically signed by:  Sylwia Sung  10/02/17 4:07 PM

## 2017-10-03 VITALS
HEIGHT: 64 IN | DIASTOLIC BLOOD PRESSURE: 72 MMHG | HEART RATE: 85 BPM | SYSTOLIC BLOOD PRESSURE: 106 MMHG | TEMPERATURE: 97.8 F | OXYGEN SATURATION: 98 % | RESPIRATION RATE: 18 BRPM | WEIGHT: 110 LBS | BODY MASS INDEX: 18.78 KG/M2

## 2017-10-03 PROBLEM — R00.0 TACHYCARDIA: Status: RESOLVED | Noted: 2017-10-01 | Resolved: 2017-10-03

## 2017-10-03 PROBLEM — E86.0 DEHYDRATION: Status: RESOLVED | Noted: 2017-10-01 | Resolved: 2017-10-03

## 2017-10-03 PROBLEM — R11.10 VOMITING: Status: RESOLVED | Noted: 2017-10-01 | Resolved: 2017-10-03

## 2017-10-03 PROBLEM — E87.8 ELECTROLYTE ABNORMALITY: Status: RESOLVED | Noted: 2017-10-01 | Resolved: 2017-10-03

## 2017-10-03 LAB
ALBUMIN SERPL-MCNC: 1.9 G/DL (ref 3.2–4.8)
ALBUMIN/GLOB SERPL: 1.6 G/DL (ref 1.5–2.5)
ALP SERPL-CCNC: 24 U/L (ref 25–100)
ALT SERPL W P-5'-P-CCNC: 7 U/L (ref 7–40)
ANION GAP SERPL CALCULATED.3IONS-SCNC: 1 MMOL/L (ref 3–11)
AST SERPL-CCNC: 16 U/L (ref 0–33)
BILIRUB SERPL-MCNC: 0.3 MG/DL (ref 0.3–1.2)
BUN BLD-MCNC: 7 MG/DL (ref 9–23)
BUN/CREAT SERPL: 23.3 (ref 7–25)
CALCIUM SPEC-SCNC: 7.3 MG/DL (ref 8.7–10.4)
CHLORIDE SERPL-SCNC: 108 MMOL/L (ref 99–109)
CO2 SERPL-SCNC: 27 MMOL/L (ref 20–31)
CREAT BLD-MCNC: 0.3 MG/DL (ref 0.6–1.3)
FERRITIN SERPL-MCNC: 86 NG/ML (ref 10–291)
GFR SERPL CREATININE-BSD FRML MDRD: >150 ML/MIN/1.73
GLOBULIN UR ELPH-MCNC: 1.2 GM/DL
GLUCOSE BLD-MCNC: 106 MG/DL (ref 70–100)
IRON 24H UR-MRATE: 102 MCG/DL (ref 50–175)
IRON SATN SFR SERPL: 64.97 % (ref 15–50)
MAGNESIUM SERPL-MCNC: 1.4 MG/DL (ref 1.3–2.7)
PHOSPHATE SERPL-MCNC: 2.4 MG/DL (ref 2.4–5.1)
POTASSIUM BLD-SCNC: 3.8 MMOL/L (ref 3.5–5.5)
PREALB SERPL-MCNC: 9.8 MG/DL (ref 10–40)
PROT SERPL-MCNC: 3.1 G/DL (ref 5.7–8.2)
SODIUM BLD-SCNC: 136 MMOL/L (ref 132–146)
TIBC SERPL-MCNC: 157 MCG/DL (ref 250–450)

## 2017-10-03 PROCEDURE — 82728 ASSAY OF FERRITIN: CPT

## 2017-10-03 PROCEDURE — 80053 COMPREHEN METABOLIC PANEL: CPT | Performed by: INTERNAL MEDICINE

## 2017-10-03 PROCEDURE — 84100 ASSAY OF PHOSPHORUS: CPT | Performed by: INTERNAL MEDICINE

## 2017-10-03 PROCEDURE — 83540 ASSAY OF IRON: CPT

## 2017-10-03 PROCEDURE — 84134 ASSAY OF PREALBUMIN: CPT

## 2017-10-03 PROCEDURE — 83735 ASSAY OF MAGNESIUM: CPT | Performed by: INTERNAL MEDICINE

## 2017-10-03 PROCEDURE — G0378 HOSPITAL OBSERVATION PER HR: HCPCS

## 2017-10-03 PROCEDURE — 99217 PR OBSERVATION CARE DISCHARGE MANAGEMENT: CPT | Performed by: NURSE PRACTITIONER

## 2017-10-03 PROCEDURE — 83550 IRON BINDING TEST: CPT

## 2017-10-03 PROCEDURE — 25810000003 SODIUM CHLORIDE 0.9 % WITH KCL 20 MEQ 20-0.9 MEQ/L-% SOLUTION: Performed by: INTERNAL MEDICINE

## 2017-10-03 RX ORDER — BISACODYL 10 MG
10 SUPPOSITORY, RECTAL RECTAL DAILY
Status: DISCONTINUED | OUTPATIENT
Start: 2017-10-03 | End: 2017-10-03 | Stop reason: HOSPADM

## 2017-10-03 RX ADMIN — PANTOPRAZOLE SODIUM 40 MG: 40 TABLET, DELAYED RELEASE ORAL at 05:48

## 2017-10-03 RX ADMIN — POTASSIUM CHLORIDE AND SODIUM CHLORIDE 125 ML/HR: 900; 150 INJECTION, SOLUTION INTRAVENOUS at 05:48

## 2017-10-03 NOTE — PLAN OF CARE
Problem: Patient Care Overview (Adult)  Goal: Plan of Care Review  Outcome: Ongoing (interventions implemented as appropriate)    10/03/17 0508   Coping/Psychosocial Response Interventions   Plan Of Care Reviewed With patient   Patient Care Overview   Progress progress toward functional goals as expected   Outcome Evaluation   Outcome Summary/Follow up Plan Pt has complained of a burning reflux feeling most of the night. But has experienced very little nausea. Anticipates going home tomorrow.

## 2017-10-03 NOTE — PROGRESS NOTES
"Adult Nutrition  Assessment/PES    Patient Name:  Saritha Simental  YOB: 1990  MRN: [id number]  Admit Date:  10/1/2017    Assessment Date:  10/3/2017    Comments:            Reason for Assessment       10/03/17 1536    Reason for Assessment    Reason For Assessment/Visit education;follow up protocol    Time Spent (min) 30    Diagnosis --   per notes this adm and    Gastrointestinal Other (comment)   N/V/D on adm    Ortho Other (comment)   scoliosis    Other diagnosis currently breastfeeding  adm w fever/dehyrdration               Nutrition/Diet History       10/03/17 1539    Nutrition/Diet History    Reported/Observed By Patient;Family    Other Has been eating variety of foods, fewer eggs lately, but does take cheese, dry beans, some poultry.  ?ing  why her \"protein is low\"               Labs/Tests/Procedures/Meds       10/03/17 1540    Labs/Tests/Procedures/Meds    Labs/Tests Review Reviewed;Other (comment)   serum total PRO,Alb, PreAlb, TIBC, Fe Sat low. Fe, Ferritin WNL,. Serum Ca++ low.                 Nutrition Prescription Ordered       10/03/17 1543    Nutrition Prescription PO    Current PO Diet Regular    Common Modifiers Vegetarian    Vegetarian Details Lacto-Ovo: allows dairy products and eggs            Evaluation of Received Nutrient/Fluid Intake       10/03/17 1544    PO Evaluation    Number of Meals 3    % PO Intake 42            Problem/Interventions:                  Intervention Goal       10/03/17 1546    Intervention Goal    General Nutrition support treatment;Provide information regarding MNT for treatment/condition            Nutrition Intervention       10/03/17 1547    Nutrition Intervention    RD/Tech Action Advise alternate selection;Follow Tx progress;Care plan reviewd;Other (comment)   interaction w PA re diet unlikely contributing in signif way to abn PRO labs; ? possible underlying renal/infection process issue. ? resolving given PreAlb value.  PA plan to address w  " Lucinda              Education/Evaluation       10/03/17 1549    Education    Education Provided education regarding;Education topics    Education Topics Other (comment)   healthy vegetarian food intake in response to pt enquiry.    Monitor/Evaluation    Monitor Per protocol   if admission extended        Electronically signed by:  Desiree Marino RD  10/03/17 4:01 PM

## 2017-10-03 NOTE — DISCHARGE SUMMARY
"    [facility] Medicine Services  DISCHARGE SUMMARY       Date of Admission: 10/1/2017  Date of Discharge:  10/3/2017  Primary Care Physician: Ramon Zamora MD  Consulting Physician(s)             None            Discharge Diagnoses:  Active Hospital Problems (** Indicates Principal Problem)    Diagnosis Date Noted   No active problems to display.      Resolved Hospital Problems    Diagnosis Date Noted Date Resolved   • **Electrolyte abnormality [E87.8] 10/01/2017 10/03/2017   • Vomiting [R11.10] 10/01/2017 10/03/2017   • Tachycardia [R00.0] 10/01/2017 10/03/2017   • Dehydration [E86.0] 10/01/2017 10/03/2017       Presenting Problem/History of Present Illness  Electrolyte abnormality [E87.8]     Chief Complaint on Day of Discharge: f/u N/V and dehydration    History of Present Illness on Day of Discharge:   Seen at 12:30 PM resting upright in bed in no acute distress.   at bedside.  States that she is tolerating diet today without nausea vomiting.  No diarrhea.  Reports no abdominal pain however has not had a bowel movement for 6 days after episode of diarrhea.  Had not been eating well.  Agreed to suppository, although she is passing flatus.  Reports she is urinating without difficulty, however feels her urine is slightly darker than normal.  She reports she is a little \"puffy all over\" compared to her baseline.  He is ambulating well.  No fever, chills.  No new issues.    Hospital Course  Patient is a 27 y.o. female presenting from home with weakness and dizziness. Patient developed symptoms consistent with viral gastroenteritis approximately 4 days ago and was seen in outside ED and given fluids approximately 3 days ago. She told the doc there she felt better even though she didn't just to \"make them feel better.\" Her diarrhea has improved, but she is still having nausea and vomiting with inability to tolerate PO intake. She felt worse and was essentially to weak to walk which " "is shy she came into the ED this admit. She was given IVF's and was admitted to the hospitalist service for further management.      Pt continued to rcvd IVF's and symptom mgmt.  Her diarrhea and vomiting resolved.  She was noted to be a \"vegetarian\" and also is still breast feeding her 1 year old.  Based on labs, pt appeared to be poorly nourished with low total protein and albumin. Clinical nutrition evaluated pt.  Pt symptoms improved.  Long discussion regarding adequate protein intake.  Encouraged pt to resume her prenatal vitamin (which she states she has NOT been taking) or to start a good OTC women's multivitamin on dc today.  We collected further labs today prior to dc including a transferrin, prealbumin, and ferritin to allow for a more complete evaluation.  Pts urine did not show any blood or protein on admit.  Today she is hemodynamically stable and afebrile.  It is felt that she likely had a viral gastroenteritis recent, in conjunction with her current hx of breast feeding and new vegetarian approx 2 months ago (and not taking her MVI).  She currently is denying n/v, diarrhea.  Mild constipation.  Will give a onetime dulcolax prior to dc home today.  She should be seen by her PCP with 1 week, sooner prn.  Return to ER for any worsening of symptoms.  She agrees.        Consults:   Consults     No orders found from 9/2/2017 to 10/2/2017.          Pertinent Test Results:  Imaging Results (last 24 hours)     ** No results found for the last 24 hours. **          Results from last 7 days  Lab Units 10/02/17  0635   WBC 10*3/mm3 8.50   HEMOGLOBIN g/dL 14.4   HEMATOCRIT % 43.4   PLATELETS 10*3/mm3 316       Results from last 7 days  Lab Units 10/03/17  0839   SODIUM mmol/L 136   POTASSIUM mmol/L 3.8   CHLORIDE mmol/L 108   CO2 mmol/L 27.0   BUN mg/dL 7*   CREATININE mg/dL 0.30*   GLUCOSE mg/dL 106*   CALCIUM mg/dL 7.3*         Condition on Discharge:  Stable     Physical Exam on Discharge:/72 (BP " "Location: Right arm, Patient Position: Lying)  Pulse 85  Temp 97.8 °F (36.6 °C) (Oral)   Resp 18  Ht 64\" (162.6 cm)  Wt 110 lb (49.9 kg)  LMP 12/31/2015  SpO2 98%  BMI 18.88 kg/m2     Physical Exam   General Appearance: Awake,  Alert, cooperative, no acute distress.  Sitting upright in bed with  at bedside.  Head: Normocephalic, atraumatic  Neck: Supple, no mass trachea midline.  Lungs: Clear to auscultation bilaterally A/P, respirations even and unlabored.  No adventitious sounds.  Heart: Regular rate and rhythm, S1 and S2 normal, no murmur, rub or gallop  Abdomen:  Soft, flat, non-tender, bowel sounds active, nondistended  Extremities: No clubbing, cyanosis, or edema to lower extremities  Skin: No rashes, warm and dry.  Neurologic: Oriented x3, Normal strength to extremities and follows commands.  Psych: Pleasant, calm, and cooperative.      Discharge Disposition  Home or Self Care    Discharge Medications   Saritha Simental   Home Medication Instructions FRANCISCO JAVIER:[id number]    Printed on:10/03/17 5729   Medication Information                      ibuprofen (ADVIL,MOTRIN) 600 MG tablet  Take 1 tablet by mouth Every 6 (Six) Hours As Needed for mild pain (1-3).             Prenatal Vit-Fe Fumarate-FA (PRENATAL 27-1) 27-1 MG tablet tablet  Take 1 tablet by mouth 3 (Three) Times a Day With Meals. C brand                 Discharge Diet:   Diet Instructions     Diet: Regular; Thin       Discharge Diet:  Regular   Fluid Consistency:  Thin                 Discharge Care Plan / Instructions:    Activity at Discharge:   Activity Instructions     Activity as Tolerated           Measure Weight                     Follow-up Appointments  No future appointments.  Additional Instructions for the Follow-ups that You Need to Schedule     Discharge Follow-up with PCP    As directed    Follow Up Details:  5-7 days                 Test Results Pending at Discharge   Order Current Status    Blood Culture - Blood, " Preliminary result    Blood Culture - Blood, Preliminary result           Nini Rashid Kristy, APRN 10/03/17 2:42 PM    Time: 45 minutes    Please note that portions of this note may have been completed with a voice recognition program. Efforts were made to edit the dictations, but occasionally words are mistranscribed.

## 2017-10-03 NOTE — PLAN OF CARE
Problem: Patient Care Overview (Adult)  Goal: Plan of Care Review  Outcome: Ongoing (interventions implemented as appropriate)  Goal: Adult Individualization and Mutuality  Outcome: Ongoing (interventions implemented as appropriate)  Goal: Discharge Needs Assessment  Outcome: Ongoing (interventions implemented as appropriate)    Problem: Sepsis (Adult)  Goal: Signs and Symptoms of Listed Potential Problems Will be Absent or Manageable (Sepsis)  Outcome: Ongoing (interventions implemented as appropriate)    Problem: Fluid Volume Deficit (Adult)  Goal: Identify Related Risk Factors and Signs and Symptoms  Outcome: Ongoing (interventions implemented as appropriate)  Goal: Fluid/Electrolyte Balance  Outcome: Ongoing (interventions implemented as appropriate)  Goal: Comfort/Well Being  Outcome: Ongoing (interventions implemented as appropriate)

## 2017-10-06 LAB
BACTERIA SPEC AEROBE CULT: NORMAL
BACTERIA SPEC AEROBE CULT: NORMAL

## 2018-04-19 LAB
EXTERNAL CHLAMYDIA SCREEN: NEGATIVE
EXTERNAL GONORRHEA SCREEN: NEGATIVE
EXTERNAL HEPATITIS B SURFACE ANTIGEN: NEGATIVE
EXTERNAL RUBELLA QUALITATIVE: NORMAL
EXTERNAL SYPHILIS RPR SCREEN: NORMAL
EXTERNAL URINE DRUG SCREEN: NEGATIVE
HIV1 P24 AG SERPL QL IA: NORMAL

## 2018-08-30 LAB — EXTERNAL GLUCOSE TOLERANCE TEST FASTING: 97 MG/DL

## 2018-09-07 LAB — EXTERNAL GROUP B STREP ANTIGEN: POSITIVE

## 2018-11-18 ENCOUNTER — APPOINTMENT (OUTPATIENT)
Dept: PREADMISSION TESTING | Facility: HOSPITAL | Age: 28
End: 2018-11-18

## 2018-11-18 ENCOUNTER — PREP FOR SURGERY (OUTPATIENT)
Dept: OTHER | Facility: HOSPITAL | Age: 28
End: 2018-11-18

## 2018-11-18 VITALS — HEIGHT: 65 IN | BODY MASS INDEX: 24.35 KG/M2 | WEIGHT: 146.16 LBS

## 2018-11-18 DIAGNOSIS — Z3A.39 39 WEEKS GESTATION OF PREGNANCY: Primary | ICD-10-CM

## 2018-11-18 LAB
ABO GROUP BLD: NORMAL
BLD GP AB SCN SERPL QL: NEGATIVE
DEPRECATED RDW RBC AUTO: 38.8 FL (ref 37–54)
ERYTHROCYTE [DISTWIDTH] IN BLOOD BY AUTOMATED COUNT: 12.6 % (ref 11.3–14.5)
HCT VFR BLD AUTO: 34.9 % (ref 34.5–44)
HGB BLD-MCNC: 11.3 G/DL (ref 11.5–15.5)
MCH RBC QN AUTO: 27.7 PG (ref 27–31)
MCHC RBC AUTO-ENTMCNC: 32.4 G/DL (ref 32–36)
MCV RBC AUTO: 85.5 FL (ref 80–99)
PLATELET # BLD AUTO: 298 10*3/MM3 (ref 150–450)
PMV BLD AUTO: 9.5 FL (ref 6–12)
RBC # BLD AUTO: 4.08 10*6/MM3 (ref 3.89–5.14)
RH BLD: POSITIVE
T&S EXPIRATION DATE: NORMAL
WBC NRBC COR # BLD: 8.41 10*3/MM3 (ref 3.5–10.8)

## 2018-11-18 PROCEDURE — 85027 COMPLETE CBC AUTOMATED: CPT | Performed by: OBSTETRICS & GYNECOLOGY

## 2018-11-18 PROCEDURE — 86901 BLOOD TYPING SEROLOGIC RH(D): CPT | Performed by: OBSTETRICS & GYNECOLOGY

## 2018-11-18 PROCEDURE — 86850 RBC ANTIBODY SCREEN: CPT | Performed by: OBSTETRICS & GYNECOLOGY

## 2018-11-18 PROCEDURE — 36415 COLL VENOUS BLD VENIPUNCTURE: CPT

## 2018-11-18 PROCEDURE — 86900 BLOOD TYPING SEROLOGIC ABO: CPT | Performed by: OBSTETRICS & GYNECOLOGY

## 2018-11-18 RX ORDER — CETIRIZINE HYDROCHLORIDE 10 MG/1
10 TABLET ORAL DAILY
COMMUNITY
End: 2020-12-03

## 2018-11-18 RX ORDER — FERROUS SULFATE 325(65) MG
325 TABLET ORAL
COMMUNITY
End: 2020-12-03

## 2018-11-18 NOTE — DISCHARGE INSTRUCTIONS
Dear Patient,    We are happy that you have chosen [facility] to have your baby.  We hope that by providing you with the following information, your childbirth experience will be a positive one.    What to know before your arrive:     -Do not eat, drink or chew gum after midnight the day before your procedure.    This also includes mints.   -Do not shave any part of your body including abdomen or pelvic are for two    days before your procedure.   -If you are taking a scheduled medication (insulin, blood pressure medicine,   antibiotics) please consult with your physician whether to take on the day of   surgery.   -Remove all jewelry including rings, wedding bands, and piercing before coming   to the hospital.   -Leave important valuables at home.   -Do not wear dark fingernail polish.   -Bring the following with you to the hospital:    -Picture ID and insurance, Medicare or Medicaid cards    -Co-pay/deductible required by insurance (Cash, Check, Credit Card)    -Copy of living will or power  document (if applicable)    -CPAP mask and tubing, not machine (if applicable)    -Skin prep instructions sheet    What to know the day of procedure:     -Park in the [facility], take elevator for first floor, exit to the right and  proceed through the doors to outside, follow the covered sidewalk to the  entrance of the [facility], follow the hallway and signs to the [facility],  enter the [facility] to your right BEFORE entering the 17[address] lobby.  Take the  elevators to the 3rd floor ([address] [facility]).   -Leave unnecessary items in your vehicle, including your suitcase.  Your support  person or a family member can get it for you after your procedure.   -Check in at the reception desk in the lobby of the 3rd floor ([address] [facility]).   -One person may accompany you to the pre-op/recovery area.  Please have  other family members wait in the waiting room.   -An anesthesiologist will meet with your prior to  your procedure.   -After anesthesia has been initiated, one person may accompany you in the  operating room.   -No video cameras are permitted in the operating room; only still cameras,  Please.      What to expect while you are in recovery:     -One person may stay with you while you are in recovery.   -If the baby is stable, he/she may visit to initiate breastfeeding & [facility].

## 2018-11-18 NOTE — PAT
Patient currently has a rash on abdomen, and stated that with her last  she used the CHG wipes and also developed a rash afterwards, therefore she does not want to use this time.

## 2018-11-19 ENCOUNTER — ANESTHESIA EVENT (OUTPATIENT)
Dept: LABOR AND DELIVERY | Facility: HOSPITAL | Age: 28
End: 2018-11-19

## 2018-11-19 ENCOUNTER — ANESTHESIA (OUTPATIENT)
Dept: LABOR AND DELIVERY | Facility: HOSPITAL | Age: 28
End: 2018-11-19

## 2018-11-19 ENCOUNTER — HOSPITAL ENCOUNTER (INPATIENT)
Facility: HOSPITAL | Age: 28
LOS: 3 days | Discharge: HOME | End: 2018-11-22
Attending: OBSTETRICS & GYNECOLOGY | Admitting: OBSTETRICS & GYNECOLOGY

## 2018-11-19 PROBLEM — Z98.891 STATUS POST REPEAT LOW TRANSVERSE CESAREAN SECTION: Status: ACTIVE | Noted: 2018-11-19

## 2018-11-19 PROBLEM — O34.219 PREVIOUS CESAREAN DELIVERY AFFECTING PREGNANCY: Status: ACTIVE | Noted: 2018-11-19

## 2018-11-19 LAB
ATMOSPHERIC PRESS: ABNORMAL MMHG
BASE EXCESS BLDCOV CALC-SCNC: 0.5 MMOL/L (ref 0–2)
BASOPHILS # BLD AUTO: 0.02 10*3/MM3 (ref 0–0.2)
BASOPHILS NFR BLD AUTO: 0.2 % (ref 0–1)
BDY SITE: ABNORMAL
BODY TEMPERATURE: 37 C
DEPRECATED RDW RBC AUTO: 38.7 FL (ref 37–54)
EOSINOPHIL # BLD AUTO: 0.04 10*3/MM3 (ref 0–0.3)
EOSINOPHIL NFR BLD AUTO: 0.4 % (ref 0–3)
ERYTHROCYTE [DISTWIDTH] IN BLOOD BY AUTOMATED COUNT: 12.5 % (ref 11.3–14.5)
HCO3 BLDCOV-SCNC: 26.4 MMOL/L (ref 18.6–21.4)
HCT VFR BLD AUTO: 31.8 % (ref 34.5–44)
HGB BLD-MCNC: 10.4 G/DL (ref 11.5–15.5)
HGB BLDA-MCNC: 14.3 G/DL (ref 14–18)
HOROWITZ INDEX BLD+IHG-RTO: 21 %
IMM GRANULOCYTES # BLD: 0.04 10*3/MM3 (ref 0–0.03)
IMM GRANULOCYTES NFR BLD: 0.4 % (ref 0–0.6)
LYMPHOCYTES # BLD AUTO: 1.66 10*3/MM3 (ref 0.6–4.8)
LYMPHOCYTES NFR BLD AUTO: 15.4 % (ref 24–44)
MCH RBC QN AUTO: 27.9 PG (ref 27–31)
MCHC RBC AUTO-ENTMCNC: 32.7 G/DL (ref 32–36)
MCV RBC AUTO: 85.3 FL (ref 80–99)
MODALITY: ABNORMAL
MONOCYTES # BLD AUTO: 0.9 10*3/MM3 (ref 0–1)
MONOCYTES NFR BLD AUTO: 8.4 % (ref 0–12)
NEUTROPHILS # BLD AUTO: 8.13 10*3/MM3 (ref 1.5–8.3)
NEUTROPHILS NFR BLD AUTO: 75.6 % (ref 41–71)
NOTE: ABNORMAL
PCO2 BLDCOV: 46.2 MM HG
PH BLDCOV: 7.37 PH UNITS
PLATELET # BLD AUTO: 242 10*3/MM3 (ref 150–450)
PMV BLD AUTO: 9.2 FL (ref 6–12)
PO2 BLDCOV: 23 MM HG
RBC # BLD AUTO: 3.73 10*6/MM3 (ref 3.89–5.14)
SAO2 % BLDCOA: ABNORMAL % (ref 45–75)
SAO2 % BLDCOV: 54.1 %
WBC NRBC COR # BLD: 10.75 10*3/MM3 (ref 3.5–10.8)

## 2018-11-19 PROCEDURE — 25010000002 ONDANSETRON PER 1 MG: Performed by: NURSE ANESTHETIST, CERTIFIED REGISTERED

## 2018-11-19 PROCEDURE — 25010000002 MIDAZOLAM PER 1 MG: Performed by: NURSE ANESTHETIST, CERTIFIED REGISTERED

## 2018-11-19 PROCEDURE — 59025 FETAL NON-STRESS TEST: CPT

## 2018-11-19 PROCEDURE — 25010000002 KETOROLAC TROMETHAMINE PER 15 MG: Performed by: OBSTETRICS & GYNECOLOGY

## 2018-11-19 PROCEDURE — 25010000002 FENTANYL CITRATE (PF) 100 MCG/2ML SOLUTION: Performed by: NURSE ANESTHETIST, CERTIFIED REGISTERED

## 2018-11-19 PROCEDURE — 82805 BLOOD GASES W/O2 SATURATION: CPT

## 2018-11-19 PROCEDURE — 25010000003 MORPHINE PER 10 MG: Performed by: NURSE ANESTHETIST, CERTIFIED REGISTERED

## 2018-11-19 PROCEDURE — 25010000003 CEFAZOLIN IN DEXTROSE 2-4 GM/100ML-% SOLUTION: Performed by: OBSTETRICS & GYNECOLOGY

## 2018-11-19 PROCEDURE — 85025 COMPLETE CBC W/AUTO DIFF WBC: CPT | Performed by: OBSTETRICS & GYNECOLOGY

## 2018-11-19 PROCEDURE — C1765 ADHESION BARRIER: HCPCS | Performed by: OBSTETRICS & GYNECOLOGY

## 2018-11-19 RX ORDER — OXYCODONE AND ACETAMINOPHEN 7.5; 325 MG/1; MG/1
1 TABLET ORAL EVERY 4 HOURS PRN
Status: DISCONTINUED | OUTPATIENT
Start: 2018-11-19 | End: 2018-11-19 | Stop reason: HOSPADM

## 2018-11-19 RX ORDER — HYDROXYZINE HYDROCHLORIDE 25 MG/1
50 TABLET, FILM COATED ORAL EVERY 6 HOURS PRN
Status: DISCONTINUED | OUTPATIENT
Start: 2018-11-19 | End: 2018-11-22 | Stop reason: HOSPADM

## 2018-11-19 RX ORDER — ACETAMINOPHEN 325 MG/1
650 TABLET ORAL ONCE AS NEEDED
Status: DISCONTINUED | OUTPATIENT
Start: 2018-11-19 | End: 2018-11-19 | Stop reason: HOSPADM

## 2018-11-19 RX ORDER — ONDANSETRON 4 MG/1
4 TABLET, FILM COATED ORAL EVERY 8 HOURS PRN
Status: DISCONTINUED | OUTPATIENT
Start: 2018-11-19 | End: 2018-11-22 | Stop reason: HOSPADM

## 2018-11-19 RX ORDER — BUPIVACAINE HYDROCHLORIDE 7.5 MG/ML
INJECTION, SOLUTION EPIDURAL; RETROBULBAR AS NEEDED
Status: DISCONTINUED | OUTPATIENT
Start: 2018-11-19 | End: 2018-11-19 | Stop reason: SURG

## 2018-11-19 RX ORDER — SODIUM CHLORIDE, SODIUM LACTATE, POTASSIUM CHLORIDE, CALCIUM CHLORIDE 600; 310; 30; 20 MG/100ML; MG/100ML; MG/100ML; MG/100ML
125 INJECTION, SOLUTION INTRAVENOUS CONTINUOUS
Status: DISCONTINUED | OUTPATIENT
Start: 2018-11-19 | End: 2018-11-19

## 2018-11-19 RX ORDER — METOCLOPRAMIDE HYDROCHLORIDE 5 MG/ML
10 INJECTION INTRAMUSCULAR; INTRAVENOUS ONCE AS NEEDED
Status: DISCONTINUED | OUTPATIENT
Start: 2018-11-19 | End: 2018-11-19 | Stop reason: HOSPADM

## 2018-11-19 RX ORDER — ONDANSETRON 2 MG/ML
4 INJECTION INTRAMUSCULAR; INTRAVENOUS ONCE
Status: DISCONTINUED | OUTPATIENT
Start: 2018-11-19 | End: 2018-11-19 | Stop reason: HOSPADM

## 2018-11-19 RX ORDER — MORPHINE SULFATE 0.5 MG/ML
INJECTION, SOLUTION EPIDURAL; INTRATHECAL; INTRAVENOUS AS NEEDED
Status: DISCONTINUED | OUTPATIENT
Start: 2018-11-19 | End: 2018-11-19 | Stop reason: SURG

## 2018-11-19 RX ORDER — FENTANYL CITRATE 50 UG/ML
INJECTION, SOLUTION INTRAMUSCULAR; INTRAVENOUS AS NEEDED
Status: DISCONTINUED | OUTPATIENT
Start: 2018-11-19 | End: 2018-11-19 | Stop reason: SURG

## 2018-11-19 RX ORDER — IBUPROFEN 600 MG/1
600 TABLET ORAL EVERY 6 HOURS PRN
Status: DISCONTINUED | OUTPATIENT
Start: 2018-11-19 | End: 2018-11-22 | Stop reason: HOSPADM

## 2018-11-19 RX ORDER — HYDROCODONE BITARTRATE AND ACETAMINOPHEN 5; 325 MG/1; MG/1
1 TABLET ORAL EVERY 4 HOURS PRN
Status: DISCONTINUED | OUTPATIENT
Start: 2018-11-19 | End: 2018-11-22 | Stop reason: HOSPADM

## 2018-11-19 RX ORDER — DOCUSATE SODIUM 100 MG/1
100 CAPSULE, LIQUID FILLED ORAL 2 TIMES DAILY PRN
Status: DISCONTINUED | OUTPATIENT
Start: 2018-11-19 | End: 2018-11-22 | Stop reason: HOSPADM

## 2018-11-19 RX ORDER — OXYCODONE AND ACETAMINOPHEN 10; 325 MG/1; MG/1
1 TABLET ORAL EVERY 4 HOURS PRN
Status: DISCONTINUED | OUTPATIENT
Start: 2018-11-19 | End: 2018-11-22 | Stop reason: HOSPADM

## 2018-11-19 RX ORDER — IBUPROFEN 600 MG/1
600 TABLET ORAL ONCE AS NEEDED
Status: COMPLETED | OUTPATIENT
Start: 2018-11-19 | End: 2018-11-19

## 2018-11-19 RX ORDER — TRISODIUM CITRATE DIHYDRATE AND CITRIC ACID MONOHYDRATE 500; 334 MG/5ML; MG/5ML
30 SOLUTION ORAL ONCE
Status: COMPLETED | OUTPATIENT
Start: 2018-11-19 | End: 2018-11-19

## 2018-11-19 RX ORDER — HYDROMORPHONE HYDROCHLORIDE 1 MG/ML
0.5 INJECTION, SOLUTION INTRAMUSCULAR; INTRAVENOUS; SUBCUTANEOUS
Status: DISCONTINUED | OUTPATIENT
Start: 2018-11-19 | End: 2018-11-19 | Stop reason: HOSPADM

## 2018-11-19 RX ORDER — LANOLIN 100 %
OINTMENT (GRAM) TOPICAL
Status: DISCONTINUED | OUTPATIENT
Start: 2018-11-19 | End: 2018-11-22 | Stop reason: HOSPADM

## 2018-11-19 RX ORDER — ONDANSETRON 2 MG/ML
INJECTION INTRAMUSCULAR; INTRAVENOUS AS NEEDED
Status: DISCONTINUED | OUTPATIENT
Start: 2018-11-19 | End: 2018-11-19 | Stop reason: SURG

## 2018-11-19 RX ORDER — NALOXONE HCL 0.4 MG/ML
0.4 VIAL (ML) INJECTION
Status: ACTIVE | OUTPATIENT
Start: 2018-11-19 | End: 2018-11-20

## 2018-11-19 RX ORDER — OXYTOCIN 10 [USP'U]/ML
INJECTION, SOLUTION INTRAMUSCULAR; INTRAVENOUS AS NEEDED
Status: DISCONTINUED | OUTPATIENT
Start: 2018-11-19 | End: 2018-11-19 | Stop reason: SURG

## 2018-11-19 RX ORDER — MIDAZOLAM HYDROCHLORIDE 1 MG/ML
INJECTION INTRAMUSCULAR; INTRAVENOUS AS NEEDED
Status: DISCONTINUED | OUTPATIENT
Start: 2018-11-19 | End: 2018-11-19 | Stop reason: SURG

## 2018-11-19 RX ORDER — SIMETHICONE 80 MG
80 TABLET,CHEWABLE ORAL 4 TIMES DAILY PRN
Status: DISCONTINUED | OUTPATIENT
Start: 2018-11-19 | End: 2018-11-22 | Stop reason: HOSPADM

## 2018-11-19 RX ORDER — CEFAZOLIN SODIUM 2 G/100ML
2 INJECTION, SOLUTION INTRAVENOUS ONCE
Status: COMPLETED | OUTPATIENT
Start: 2018-11-19 | End: 2018-11-19

## 2018-11-19 RX ORDER — KETOROLAC TROMETHAMINE 15 MG/ML
15 INJECTION, SOLUTION INTRAMUSCULAR; INTRAVENOUS EVERY 6 HOURS PRN
Status: DISPENSED | OUTPATIENT
Start: 2018-11-19 | End: 2018-11-20

## 2018-11-19 RX ADMIN — IBUPROFEN 600 MG: 600 TABLET, FILM COATED ORAL at 12:30

## 2018-11-19 RX ADMIN — MIDAZOLAM HYDROCHLORIDE 1 MG: 1 INJECTION, SOLUTION INTRAMUSCULAR; INTRAVENOUS at 10:34

## 2018-11-19 RX ADMIN — OXYTOCIN 30 UNITS: 10 INJECTION, SOLUTION INTRAMUSCULAR; INTRAVENOUS at 10:59

## 2018-11-19 RX ADMIN — MORPHINE SULFATE 150 MCG: 0.5 INJECTION, SOLUTION EPIDURAL; INTRATHECAL; INTRAVENOUS at 10:40

## 2018-11-19 RX ADMIN — ONDANSETRON 4 MG: 4 TABLET, FILM COATED ORAL at 19:07

## 2018-11-19 RX ADMIN — ONDANSETRON 4 MG: 2 INJECTION INTRAMUSCULAR; INTRAVENOUS at 10:34

## 2018-11-19 RX ADMIN — SODIUM CHLORIDE, POTASSIUM CHLORIDE, SODIUM LACTATE AND CALCIUM CHLORIDE: 600; 310; 30; 20 INJECTION, SOLUTION INTRAVENOUS at 11:21

## 2018-11-19 RX ADMIN — MIDAZOLAM HYDROCHLORIDE 1 MG: 1 INJECTION, SOLUTION INTRAMUSCULAR; INTRAVENOUS at 11:11

## 2018-11-19 RX ADMIN — OXYCODONE HYDROCHLORIDE AND ACETAMINOPHEN 1 TABLET: 7.5; 325 TABLET ORAL at 11:51

## 2018-11-19 RX ADMIN — CEFAZOLIN SODIUM 2 G: 2 INJECTION, SOLUTION INTRAVENOUS at 10:27

## 2018-11-19 RX ADMIN — KETOROLAC TROMETHAMINE 15 MG: 15 INJECTION, SOLUTION INTRAMUSCULAR; INTRAVENOUS at 17:49

## 2018-11-19 RX ADMIN — OXYCODONE HYDROCHLORIDE AND ACETAMINOPHEN 1 TABLET: 10; 325 TABLET ORAL at 21:29

## 2018-11-19 RX ADMIN — FENTANYL CITRATE 15 MCG: 50 INJECTION, SOLUTION INTRAMUSCULAR; INTRAVENOUS at 10:40

## 2018-11-19 RX ADMIN — FENTANYL CITRATE 35 MCG: 50 INJECTION, SOLUTION INTRAMUSCULAR; INTRAVENOUS at 11:11

## 2018-11-19 RX ADMIN — SODIUM CHLORIDE, POTASSIUM CHLORIDE, SODIUM LACTATE AND CALCIUM CHLORIDE 125 ML/HR: 600; 310; 30; 20 INJECTION, SOLUTION INTRAVENOUS at 09:18

## 2018-11-19 RX ADMIN — OXYCODONE HYDROCHLORIDE AND ACETAMINOPHEN 1 TABLET: 10; 325 TABLET ORAL at 15:23

## 2018-11-19 RX ADMIN — SODIUM CHLORIDE, POTASSIUM CHLORIDE, SODIUM LACTATE AND CALCIUM CHLORIDE: 600; 310; 30; 20 INJECTION, SOLUTION INTRAVENOUS at 10:53

## 2018-11-19 RX ADMIN — SODIUM CITRATE AND CITRIC ACID MONOHYDRATE 30 ML: 500; 334 SOLUTION ORAL at 10:29

## 2018-11-19 RX ADMIN — SODIUM CHLORIDE, POTASSIUM CHLORIDE, SODIUM LACTATE AND CALCIUM CHLORIDE 125 ML/HR: 600; 310; 30; 20 INJECTION, SOLUTION INTRAVENOUS at 10:09

## 2018-11-19 RX ADMIN — BUPIVACAINE HYDROCHLORIDE 1.4 ML: 7.5 INJECTION, SOLUTION EPIDURAL; RETROBULBAR at 10:40

## 2018-11-19 RX ADMIN — OXYTOCIN 20 UNITS: 10 INJECTION, SOLUTION INTRAMUSCULAR; INTRAVENOUS at 11:21

## 2018-11-19 NOTE — OP NOTE
Section Procedure Note    Indications: Previous  section, desires repeat    Pre-operative Diagnosis: 1: 39w2d                Patient Active Problem List   Diagnosis   • Status post repeat low transverse  section                Post-operative Diagnosis: 1:  Same.     Procedures:  Procedure(s):   SECTION REPEAT LTUI    Surgeon:  Marina Mir MD    Anesthesia:  Spinal    Estimated Blood Loss:  800 mL    Infant:            Gender: female  infant    Weight: 3411 g (7 lb 8.3 oz)     Apgars: 8   @ 1 minute /     9   @ 5 minutes               Findings:       The infant was delivered from the Presentation/Position: Vertex ;           The amnionic fluid was Clear     Drains:  Funk catheter to straight drainage.                      Antibiotics:  cefazolin (Ancef)           Complications:  None; patient tolerated the procedure well.           Disposition: PACU - hemodynamically stable.           Condition: stable    Surgeon: Marina Mir MD         Anesthesia: Spinal anesthesia        Procedure Details   The patient was seen in the Holding Room. The risks, benefits, complications, treatment options, and expected outcomes were discussed with the patient.  The patient concurred with the proposed plan, giving informed consent.  The patient was taken to the Operating Room, identified as Saritha Simental and the procedure verified as  Delivery. A Time Out was held and the above information confirmed.    After an adequate level of anesthesia was obtained, the patient was draped and prepped in the usual sterile manner. A Pfannenstiel incision was made and carried down through the subcutaneous tissue to the fascia. Fascial incision was made and extended transversely with the Bautista scissors. The fascia was  bluntly and sharply from the underlying rectus tissue superiorly and inferiorly. The rectus muscles were divided sharply. The peritoneum was identified and entered. Peritoneal  incision was extended longitudinally taking care not to injure the bladder and bowel.  The bladder flap was sharply and bluntly developed  A low transverse uterine incision was made with the scalpel.  Delivered from  Vertex .  After the umbilical cord was milked, clamped and cut, cord blood was obtained for evaluation. The placenta was expressed intact and appeared normal.  The uterus was everted from the abdomen and curetted with a moist lap sponge x 2.    The uterine outline, tubes and ovaries appeared normal. The uterine incision was closed in two layers with a running continuous locking suture of #1 chromic. Hemostasis was obtained.  Irrigation was performed and counts were correct;  The uterus was replaced into the abdomen and the lateral gutters were irrigated.  The incision was reinspected and noted to be hemostatic.  Intercede was placed over the hysterotomy.  The peritoneum was closed with a running continuous suture of 2-0 Vicryl;  The rectus muscles reapproximated with interrupted sutures of 2-0 Vicryl. The fascia was then reapproximated with running sutures of 0 Vicryl. The subcutaneous layer was irrigated, noted to be hemostatic, and closed with 3-0 plain Gut.  The skin was reapproximated with staples.    Instrument, sponge, and needle counts were correct prior the abdominal closure and at the conclusion of the case. The patient went to the Recovery Room in stable condition with the tello draining clear urine.       Marina Mir MD      11/19/2018  11:45 AM

## 2018-11-19 NOTE — PLAN OF CARE
Problem: Patient Care Overview  Goal: Plan of Care Review  Outcome: Ongoing (interventions implemented as appropriate)   18 1204   Coping/Psychosocial   Plan of Care Reviewed With patient;spouse     Goal: Individualization and Mutuality  Outcome: Ongoing (interventions implemented as appropriate)   18 1204   Individualization   Patient Specific Preferences Pt.desires to breastfeed     Goal: Discharge Needs Assessment  Outcome: Ongoing (interventions implemented as appropriate)   18 1204   Discharge Needs Assessment   Concerns to be Addressed denies needs/concerns at this time     Goal: Interprofessional Rounds/Family Conf  Outcome: Ongoing (interventions implemented as appropriate)   18 1204   Interdisciplinary Rounds/Family Conf   Participants nursing       Problem:  Delivery (Adult,Obstetrics,Pediatric)  Goal: Signs and Symptoms of Listed Potential Problems Will be Absent, Minimized or Managed ( Delivery)  Outcome: Outcome(s) achieved Date Met: 18 1204   Goal/Outcome Evaluation   Problems Assessed ( Delivery) all   Problems Present ( Delivery) none

## 2018-11-19 NOTE — H&P
"History and Physical  [facility]    Chief Complaint   Patient presents with   • Scheduled        Patient Active Problem List   Diagnosis   • Previous  delivery affecting pregnancy       Saritha Simental is a 28 y.o. year old  with an Estimated Date of Delivery: 18 currently at 39w2d presenting with no complaints.    Prenatal care has been with Dr. Mir.  It has been significant for uncomplicated.    No Additional Complaints Reported    The following portions of the patient's history were reviewed and updated as appropriate:vital signs, allergies, current medications, past medical history, past social history, past surgical history and problem list.    Review of Systems  Pertinent items are noted in HPI.     Objective     /77 (BP Location: Right arm)   Pulse 93   Temp 98.2 °F (36.8 °C) (Oral)   Resp 18   Ht 162.6 cm (64\")   Wt 65.3 kg (144 lb)   Breastfeeding? Yes   BMI 24.72 kg/m²     Physical Exam    General:  well developed; well nourished  no acute distress           Abdomen: soft, non-tender; no masses       FHT's: reactive and category 1     Lab Review   Labs: No data reviewed   Lab Results (last 24 hours)     Procedure Component Value Units Date/Time    RPR [[id number]] Resulted:  18     Specimen:  Blood Updated:  18 1002     External RPR Non-Reactive    Rubella Antibody, IgG [[id number]] Resulted:  18     Specimen:  Blood Updated:  18 1002     External Rubella Qual Immune    HIV-1 Antibody, EIA [[id number]] Resulted:  18     Specimen:  Blood Updated:  18 1002     External HIV Antibody Non-Reactive    Chlamydia trachomatis, Neisseria gonorrhoeae, PCR w/ confirmation - Swab, Vagina [[id number]] Resulted:  18     Specimen:  Swab from Vagina Updated:  18 1002     External Chlamydia Screen Negative    Gonorrhea Screen - Swab, [[id number]] Resulted:  18     Specimen:  Swab Updated:  18 1002     External " Gonorrhea Screen Negative    Urine Drug Screen - Urine, Clean Catch [[id number]] Resulted:  18     Specimen:  Urine, Clean Catch Updated:  18 1002     External Urine Drug Screen Negative    GTT Fasting [[id number]] Resulted:  18     Specimen:  Blood Updated:  18 1002     External Glucose, GTT - Fasting 97 mg/dL     Hepatitis B Surface Antigen [[id number]] Resulted:  18     Specimen:  Blood Updated:  18 1002     External Hepatitis B Surface Ag Negative    Group B Streptococcus Culture - Swab, Vaginal/Rectum [[id number]] Resulted:  18     Specimen:  Swab from Vaginal/Rectum Updated:  18 1002     External Strep Group B Ag Positive     Comment: In urine             Imaging   No data reviewed   Imaging Results (most recent)     None        Assessment/Plan     ASSESSMENT  1. IUP at 39w2d  2.   Previous  delivery affecting pregnancy  3. Desires repeat c/s    PLAN  1. Admit for repeat C/S         Marina Mir MD  201810:20 AM

## 2018-11-19 NOTE — ANESTHESIA PROCEDURE NOTES
Spinal Block      Patient reassessed immediately prior to procedure    Patient location during procedure: OR  Indication:at surgeon's request  Performed By  Anesthesiologist: Anup Nicole DO  CRNA: Kate Mac CRNA  Preanesthetic Checklist  Completed: patient identified, surgical consent, pre-op evaluation, timeout performed, IV checked, risks and benefits discussed and monitors and equipment checked  Spinal Block Prep:  Patient Position:sitting  Sterile Tech:cap, gloves, mask and sterile barriers  Prep:Betadine  Patient Monitoring:blood pressure monitoring, continuous pulse oximetry and EKG  Spinal Block Procedure  Approach:midline  Guidance:palpation technique  Location:L4-L5  Needle Type:Lisandra  Needle Gauge:25 G  Placement of Spinal needle event:cerebrospinal fluid aspirated  Paresthesia: no  Fluid Appearance:clear     Post Assessment  Patient Tolerance:patient tolerated the procedure well with no apparent complications  Complications no

## 2018-11-19 NOTE — LACTATION NOTE
11/19/18 1500   Maternal Information   Date of Referral 11/19/18   Person Making Referral other (see comments)  (courtesy)   Maternal Infant Feeding   Maternal Emotional State anxious   Equipment Type   Breast Pump Type double electric, personal   Reproductive Interventions   Breast Care: Breastfeeding frequency of feeding adjusted   Breastfeeding Assistance feeding on demand promoted;support offered;feeding cue recognition promoted   Breastfeeding Support diary/feeding log utilized;encouragement provided;lactation counseling provided

## 2018-11-19 NOTE — ANESTHESIA PREPROCEDURE EVALUATION
Anesthesia Evaluation     Patient summary reviewed and Nursing notes reviewed   NPO Solid Status: > 8 hours  NPO Liquid Status: > 8 hours           Airway   Mallampati: II  TM distance: >3 FB  Neck ROM: full  Dental      Pulmonary - negative pulmonary ROS   Cardiovascular - negative cardio ROS        Neuro/Psych  (+) headaches, psychiatric history Anxiety,       ROS Comment: Tethered cord  GI/Hepatic/Renal/Endo - negative ROS     Musculoskeletal (-) negative ROS    Abdominal    Substance History - negative use     OB/GYN    (+) Pregnant,         Other - negative ROS                       Anesthesia Plan    ASA 2     spinal and ITN     Anesthetic plan, all risks, benefits, and alternatives have been provided, discussed and informed consent has been obtained with: patient.

## 2018-11-19 NOTE — ANESTHESIA POSTPROCEDURE EVALUATION
Patient: Saritha Simental    Procedure Summary     Date:  18 Room / Location:  [facility] DELIV[facility]   [facility]    Anesthesia Start:  1033 Anesthesia Stop:  113    Procedure:   SECTION REPEAT (N/A Abdomen) Diagnosis:      Surgeon:  Marina Mir MD Provider:  Anup Nicole DO    Anesthesia Type:  spinal, ITN ASA Status:  2          Anesthesia Type: spinal, ITN  Last vitals  BP   100/73 (18 1132)   Temp   97.5 °F (36.4 °C) (18 1132)   Pulse   98 (18 1132)   Resp   18 (18 1132)     SpO2   97 % (18 1132)     Post Anesthesia Care and Evaluation    Patient location during evaluation: bedside  Patient participation: complete - patient participated  Level of consciousness: awake and alert  Pain management: adequate  Airway patency: patent  Anesthetic complications: No anesthetic complications    Cardiovascular status: acceptable  Respiratory status: acceptable  Hydration status: acceptable

## 2018-11-20 LAB
BASOPHILS # BLD AUTO: 0.02 10*3/MM3 (ref 0–0.2)
BASOPHILS NFR BLD AUTO: 0.2 % (ref 0–1)
DEPRECATED RDW RBC AUTO: 38.3 FL (ref 37–54)
EOSINOPHIL # BLD AUTO: 0.12 10*3/MM3 (ref 0–0.3)
EOSINOPHIL NFR BLD AUTO: 1.4 % (ref 0–3)
ERYTHROCYTE [DISTWIDTH] IN BLOOD BY AUTOMATED COUNT: 12.5 % (ref 11.3–14.5)
HCT VFR BLD AUTO: 32.9 % (ref 34.5–44)
HGB BLD-MCNC: 10.7 G/DL (ref 11.5–15.5)
IMM GRANULOCYTES # BLD: 0.03 10*3/MM3 (ref 0–0.03)
IMM GRANULOCYTES NFR BLD: 0.3 % (ref 0–0.6)
LYMPHOCYTES # BLD AUTO: 1.14 10*3/MM3 (ref 0.6–4.8)
LYMPHOCYTES NFR BLD AUTO: 13.2 % (ref 24–44)
MCH RBC QN AUTO: 27.5 PG (ref 27–31)
MCHC RBC AUTO-ENTMCNC: 32.5 G/DL (ref 32–36)
MCV RBC AUTO: 84.6 FL (ref 80–99)
MONOCYTES # BLD AUTO: 0.63 10*3/MM3 (ref 0–1)
MONOCYTES NFR BLD AUTO: 7.3 % (ref 0–12)
NEUTROPHILS # BLD AUTO: 6.71 10*3/MM3 (ref 1.5–8.3)
NEUTROPHILS NFR BLD AUTO: 77.6 % (ref 41–71)
PLATELET # BLD AUTO: 201 10*3/MM3 (ref 150–450)
PMV BLD AUTO: 9.1 FL (ref 6–12)
RBC # BLD AUTO: 3.89 10*6/MM3 (ref 3.89–5.14)
WBC NRBC COR # BLD: 8.65 10*3/MM3 (ref 3.5–10.8)

## 2018-11-20 PROCEDURE — 85025 COMPLETE CBC W/AUTO DIFF WBC: CPT | Performed by: OBSTETRICS & GYNECOLOGY

## 2018-11-20 RX ADMIN — OXYCODONE HYDROCHLORIDE AND ACETAMINOPHEN 1 TABLET: 10; 325 TABLET ORAL at 12:53

## 2018-11-20 RX ADMIN — IBUPROFEN 600 MG: 600 TABLET ORAL at 10:59

## 2018-11-20 RX ADMIN — SIMETHICONE CHEW TAB 80 MG 80 MG: 80 TABLET ORAL at 17:08

## 2018-11-20 RX ADMIN — OXYCODONE HYDROCHLORIDE AND ACETAMINOPHEN 1 TABLET: 10; 325 TABLET ORAL at 17:08

## 2018-11-20 RX ADMIN — IBUPROFEN 600 MG: 600 TABLET ORAL at 17:08

## 2018-11-20 RX ADMIN — OXYCODONE HYDROCHLORIDE AND ACETAMINOPHEN 1 TABLET: 10; 325 TABLET ORAL at 03:11

## 2018-11-20 RX ADMIN — IBUPROFEN 600 MG: 600 TABLET ORAL at 23:50

## 2018-11-20 RX ADMIN — SIMETHICONE CHEW TAB 80 MG 80 MG: 80 TABLET ORAL at 08:29

## 2018-11-20 RX ADMIN — OXYCODONE HYDROCHLORIDE AND ACETAMINOPHEN 1 TABLET: 10; 325 TABLET ORAL at 21:22

## 2018-11-20 RX ADMIN — OXYCODONE HYDROCHLORIDE AND ACETAMINOPHEN 1 TABLET: 10; 325 TABLET ORAL at 08:29

## 2018-11-20 RX ADMIN — IBUPROFEN 600 MG: 600 TABLET ORAL at 03:34

## 2018-11-20 RX ADMIN — DOCUSATE SODIUM 100 MG: 100 CAPSULE, LIQUID FILLED ORAL at 03:10

## 2018-11-20 NOTE — PLAN OF CARE
Problem: Patient Care Overview  Goal: Plan of Care Review  Outcome: Ongoing (interventions implemented as appropriate)    Goal: Discharge Needs Assessment  Outcome: Ongoing (interventions implemented as appropriate)    Goal: Interprofessional Rounds/Family Conf  Outcome: Ongoing (interventions implemented as appropriate)

## 2018-11-20 NOTE — ANESTHESIA POSTPROCEDURE EVALUATION
Patient: Saritha Simental    Procedure Summary     Date:  18 Room / Location:  [facility] DELIVERY   [facility]    Anesthesia Start:  1033 Anesthesia Stop:  1131    Procedure:   SECTION REPEAT (N/A Abdomen) Diagnosis:      Surgeon:  Marina Mir MD Provider:  Anup Nicole DO    Anesthesia Type:  spinal, ITN ASA Status:  2          Anesthesia Type: spinal, ITN  Last vitals  BP   95/55 (18 0400)   Temp   98.5 °F (36.9 °C) (18 0400)   Pulse   65 (18 0400)   Resp   16 (18 0400)     SpO2   100 % (18 1245)     Post Anesthesia Care and Evaluation    Patient location during evaluation: bedside  Patient participation: complete - patient participated  Level of consciousness: awake and alert  Pain management: adequate  Airway patency: patent  Anesthetic complications: No anesthetic complications    Cardiovascular status: acceptable  Respiratory status: acceptable  Hydration status: acceptable  Post Neuraxial Block status: Motor and sensory function returned to baseline and No signs or symptoms of PDPH

## 2018-11-20 NOTE — PROGRESS NOTES
2018    Name:Saritha Simental    MR#:[id number]     PROGRESS NOTE:  Post-Op Day 1 S/P    HD:1    Subjective   28 y.o. yo Female  s/p CS at 39w2d doing well. Pain well controlled. Tolerating regular diet and having flatus. Lochia normal.     Patient Active Problem List   Diagnosis   • Status post repeat low transverse  section        Objective    Vitals  Temp:  Temp:  [97.5 °F (36.4 °C)-98.7 °F (37.1 °C)] 98.5 °F (36.9 °C)  Temp src: Oral  BP:  BP: ()/(54-92) 95/55  Pulse:  Heart Rate:  [62-98] 65  RR:   Resp:  [16-18] 16    General Awake, alert, no distress  Abdomen Soft, non-distended, fundus firm, below umbilicus, appropriately tender  Incision  Drsg Intact, no erythema or exudate  Extremities Calves NT bilaterally     I/O last 3 completed shifts:  In: 1500 [I.V.:1500]  Out: 3650 [Urine:2850; Blood:800]    LABS:   Lab Results   Component Value Date    WBC 8.65 2018    HGB 10.7 (L) 2018    HCT 32.9 (L) 2018    MCV 84.6 2018     2018       Infant: female       Assessment   1.  POD 1 repeat C/S    Plan: Doing well.  Routine postoperative care. Advance.       Active Problems:   None      SAM Shanks  2018 8:45 AM

## 2018-11-21 RX ADMIN — SIMETHICONE CHEW TAB 80 MG 80 MG: 80 TABLET ORAL at 18:49

## 2018-11-21 RX ADMIN — DOCUSATE SODIUM 100 MG: 100 CAPSULE, LIQUID FILLED ORAL at 18:49

## 2018-11-21 RX ADMIN — OXYCODONE HYDROCHLORIDE AND ACETAMINOPHEN 1 TABLET: 10; 325 TABLET ORAL at 20:22

## 2018-11-21 RX ADMIN — IBUPROFEN 600 MG: 600 TABLET ORAL at 12:58

## 2018-11-21 RX ADMIN — SIMETHICONE CHEW TAB 80 MG 80 MG: 80 TABLET ORAL at 07:15

## 2018-11-21 RX ADMIN — DOCUSATE SODIUM 100 MG: 100 CAPSULE, LIQUID FILLED ORAL at 07:15

## 2018-11-21 RX ADMIN — OXYCODONE HYDROCHLORIDE AND ACETAMINOPHEN 1 TABLET: 10; 325 TABLET ORAL at 03:05

## 2018-11-21 RX ADMIN — OXYCODONE HYDROCHLORIDE AND ACETAMINOPHEN 1 TABLET: 10; 325 TABLET ORAL at 07:15

## 2018-11-21 RX ADMIN — OXYCODONE HYDROCHLORIDE AND ACETAMINOPHEN 1 TABLET: 10; 325 TABLET ORAL at 11:46

## 2018-11-21 RX ADMIN — SIMETHICONE CHEW TAB 80 MG 80 MG: 80 TABLET ORAL at 20:22

## 2018-11-21 RX ADMIN — SIMETHICONE CHEW TAB 80 MG 80 MG: 80 TABLET ORAL at 12:58

## 2018-11-21 RX ADMIN — IBUPROFEN 600 MG: 600 TABLET ORAL at 07:15

## 2018-11-21 RX ADMIN — OXYCODONE HYDROCHLORIDE AND ACETAMINOPHEN 1 TABLET: 10; 325 TABLET ORAL at 16:24

## 2018-11-21 RX ADMIN — IBUPROFEN 600 MG: 600 TABLET ORAL at 18:49

## 2018-11-21 NOTE — PROGRESS NOTES
2018    Name:Saritha Simental    MR#:[id number]     PROGRESS NOTE:  Post-Op 2 S/P    HD:2    Subjective   28 y.o. yo Female  s/p CS at 39w2d doing well. Pain well controlled. Tolerating regular diet and having flatus. Lochia normal.     Patient Active Problem List   Diagnosis   • Status post repeat low transverse  section        Objective    Vitals  Temp:  Temp:  [97.7 °F (36.5 °C)-98.5 °F (36.9 °C)] 98 °F (36.7 °C)  Temp src: Oral  BP:  BP: ()/(59-78) 111/78  Pulse:  Heart Rate:  [72-77] 77  RR:   Resp:  [16-18] 18    General Awake, alert, no distress  Abdomen Soft, non-distended, fundus firm, below umbilicus, appropriately tender  Incision  Staples Intact, no erythema or exudate  Extremities Calves NT bilaterally     I/O last 3 completed shifts:  In: -   Out: 3750 [Urine:3750]    LABS:   Lab Results   Component Value Date    WBC 8.65 2018    HGB 10.7 (L) 2018    HCT 32.9 (L) 2018    MCV 84.6 2018     2018       Infant: female , doing well      Assessment   1.  POD 2    Plan: Doing well.  Routine postoperative care      Active Problems:   None      Araceli Ansari, SAM  2018 9:13 AM

## 2018-11-21 NOTE — CONSULTS
Continued Stay Note  [facility]     Patient Name: Saritha Simental  MRN: [id number]  Today's Date: 11/21/2018    Admit Date: 11/19/2018    Discharge Plan     Row Name 11/21/18 1141       Plan    Plan  MSW Available    Plan Comments  Spoke with pt. Discussed PPD and gave handout. She says she had some anxiety during the final weeks of her pregnancy but feels much better since delivery. She says she has not struggled with depression in the past and did not have PPD with her 2 year old son.    Row Name 11/21/18 1130       Plan    Plan  MSW Available    Plan Comments  Spoke with pt. Discussed PPD and gave brochure. pt states she was anxious during the final weeks of her pregnancy but has felt much better since delivery. She has a 2 year old at home and did not have PPD with him. She says she has not struggled with depression before.        Discharge Codes    No documentation.             Gabrielle Vasques MSW

## 2018-11-21 NOTE — PLAN OF CARE
Problem: Patient Care Overview  Goal: Plan of Care Review  Outcome: Ongoing (interventions implemented as appropriate)   18   Coping/Psychosocial   Plan of Care Reviewed With patient   Plan of Care Review   Progress improving   OTHER   Outcome Summary incision dry without drainage, staples intact, voiding, light bleeding, V/S WDL       Problem:  Delivery (Adult,Obstetrics,Pediatric)  Goal: Signs and Symptoms of Listed Potential Problems Will be Absent, Minimized or Managed ( Delivery)  Outcome: Ongoing (interventions implemented as appropriate)   18   Goal/Outcome Evaluation   Problems Assessed ( Delivery) all   Problems Present ( Delivery) none

## 2018-11-22 VITALS
OXYGEN SATURATION: 100 % | DIASTOLIC BLOOD PRESSURE: 83 MMHG | HEART RATE: 77 BPM | WEIGHT: 144 LBS | SYSTOLIC BLOOD PRESSURE: 107 MMHG | BODY MASS INDEX: 24.59 KG/M2 | TEMPERATURE: 98 F | RESPIRATION RATE: 14 BRPM | HEIGHT: 64 IN

## 2018-11-22 RX ORDER — IBUPROFEN 600 MG/1
600 TABLET ORAL EVERY 6 HOURS PRN
Qty: 30 TABLET | Refills: 0 | Status: SHIPPED | OUTPATIENT
Start: 2018-11-22 | End: 2020-12-03

## 2018-11-22 RX ORDER — HYDROCODONE BITARTRATE AND ACETAMINOPHEN 5; 325 MG/1; MG/1
2 TABLET ORAL EVERY 4 HOURS PRN
Qty: 24 TABLET | Refills: 0 | Status: SHIPPED | OUTPATIENT
Start: 2018-11-22 | End: 2018-11-29

## 2018-11-22 RX ADMIN — OXYCODONE HYDROCHLORIDE AND ACETAMINOPHEN 1 TABLET: 10; 325 TABLET ORAL at 11:08

## 2018-11-22 RX ADMIN — OXYCODONE HYDROCHLORIDE AND ACETAMINOPHEN 1 TABLET: 10; 325 TABLET ORAL at 06:50

## 2018-11-22 RX ADMIN — OXYCODONE HYDROCHLORIDE AND ACETAMINOPHEN 1 TABLET: 10; 325 TABLET ORAL at 00:55

## 2018-11-22 RX ADMIN — IBUPROFEN 600 MG: 600 TABLET ORAL at 00:55

## 2018-11-22 RX ADMIN — DOCUSATE SODIUM 100 MG: 100 CAPSULE, LIQUID FILLED ORAL at 09:10

## 2018-11-22 RX ADMIN — IBUPROFEN 600 MG: 600 TABLET ORAL at 06:50

## 2018-11-22 NOTE — PLAN OF CARE
Problem: Patient Care Overview  Goal: Plan of Care Review  Outcome: Outcome(s) achieved Date Met: 18 0800   Coping/Psychosocial   Plan of Care Reviewed With patient   Plan of Care Review   Progress improving   OTHER   Outcome Summary D/C home today     Goal: Discharge Needs Assessment  Outcome: Outcome(s) achieved Date Met: 18    Goal: Interprofessional Rounds/Family Conf  Outcome: Outcome(s) achieved Date Met: 18      Problem: Breastfeeding (Adult,Obstetrics,Pediatric)  Goal: Signs and Symptoms of Listed Potential Problems Will be Absent, Minimized or Managed (Breastfeeding)  Outcome: Outcome(s) achieved Date Met: 18      Problem: Postpartum ( Delivery) (Adult,Obstetrics,Pediatric)  Goal: Signs and Symptoms of Listed Potential Problems Will be Absent, Minimized or Managed (Postpartum)  Outcome: Outcome(s) achieved Date Met: 18    Goal: Anesthesia/Sedation Recovery  Outcome: Outcome(s) achieved Date Met: 18

## 2018-11-22 NOTE — DISCHARGE SUMMARY
2018    Name:Saritha Simental    MR#:[id number]     PROGRESS NOTE:  Post-Op 3 S/P        Subjective   28 y.o. yo Female  s/p CS at 39w2d doing well. Pain well controlled. Tolerating regular diet and having flatus. Lochia normal.     Patient Active Problem List   Diagnosis   • Status post repeat low transverse  section        Objective    Vitals  Temp:  Temp:  [97.8 °F (36.6 °C)-98.2 °F (36.8 °C)] 98 °F (36.7 °C)  Temp src: Oral  BP:  BP: (107-113)/(70-83) 107/83  Pulse:  Heart Rate:  [74-80] 77  RR:   Resp:  [14-16] 14    General Awake, alert, no distress  Abdomen Soft, non-distended, fundus firm, below umbilicus, appropriately tender  Incision  Intact, no erythema or exudate  Extremities Calves NT bilaterally     No intake/output data recorded.    LABS:   Lab Results   Component Value Date    WBC 8.65 2018    HGB 10.7 (L) 2018    HCT 32.9 (L) 2018    MCV 84.6 2018     2018       Infant: female       Assessment   1.  POD 3 from c/s    Plan: Doing well.    Discharge to home. Scripts for Percocet and Motrin provided and advised on weaning.   Follow up in 2 weeks for incision check.  No driving for 2 weeks.   No Tampons, swimming, intercourse or tub baths for 6 weeks.             Harika Bryant MD  2018 10:35 AM

## 2018-11-22 NOTE — PLAN OF CARE
Problem: Patient Care Overview  Goal: Plan of Care Review  Outcome: Ongoing (interventions implemented as appropriate)   18 0502   Coping/Psychosocial   Plan of Care Reviewed With patient   Plan of Care Review   Progress improving   OTHER   Outcome Summary VSS. Breastfeeind well. Pain controlled with meds. U/1, firm, light bleeding. Staples. Progressing towards D/C.     Goal: Individualization and Mutuality  Outcome: Ongoing (interventions implemented as appropriate)    Goal: Discharge Needs Assessment  Outcome: Ongoing (interventions implemented as appropriate)    Goal: Interprofessional Rounds/Family Conf  Outcome: Ongoing (interventions implemented as appropriate)      Problem: Breastfeeding (Adult,Obstetrics,Pediatric)  Goal: Signs and Symptoms of Listed Potential Problems Will be Absent, Minimized or Managed (Breastfeeding)  Outcome: Ongoing (interventions implemented as appropriate)      Problem: Postpartum ( Delivery) (Adult,Obstetrics,Pediatric)  Goal: Signs and Symptoms of Listed Potential Problems Will be Absent, Minimized or Managed (Postpartum)  Outcome: Ongoing (interventions implemented as appropriate)    Goal: Anesthesia/Sedation Recovery  Outcome: Ongoing (interventions implemented as appropriate)

## 2020-12-03 ENCOUNTER — OFFICE VISIT (OUTPATIENT)
Dept: OBSTETRICS AND GYNECOLOGY | Facility: CLINIC | Age: 30
End: 2020-12-03

## 2020-12-03 VITALS
WEIGHT: 113.7 LBS | DIASTOLIC BLOOD PRESSURE: 68 MMHG | HEIGHT: 64 IN | SYSTOLIC BLOOD PRESSURE: 112 MMHG | BODY MASS INDEX: 19.41 KG/M2

## 2020-12-03 DIAGNOSIS — N94.3 PMS (PREMENSTRUAL SYNDROME): Primary | ICD-10-CM

## 2020-12-03 DIAGNOSIS — G43.831 MENSTRUAL MIGRAINE, WITH INTRACTABLE MIGRAINE, SO STATED, WITH STATUS MIGRAINOSUS: ICD-10-CM

## 2020-12-03 PROCEDURE — 99213 OFFICE O/P EST LOW 20 MIN: CPT | Performed by: OBSTETRICS & GYNECOLOGY

## 2020-12-03 RX ORDER — LEVONORGESTREL AND ETHINYL ESTRADIOL 100-20(84)
1 KIT ORAL DAILY
Qty: 84 EACH | Refills: 3 | Status: SHIPPED | OUTPATIENT
Start: 2020-12-03 | End: 2023-02-20

## 2020-12-03 RX ORDER — AMITRIPTYLINE HYDROCHLORIDE 10 MG/1
TABLET, FILM COATED ORAL
COMMUNITY
Start: 2020-11-29 | End: 2023-02-20

## 2020-12-03 RX ORDER — SUMATRIPTAN 100 MG/1
TABLET, FILM COATED ORAL
COMMUNITY
Start: 2020-10-01 | End: 2023-02-20

## 2020-12-03 NOTE — PROGRESS NOTES
Chief Complaint   Patient presents with   • Premenstrual Syndrome       Subjective   HPI  Saritha Simental is a 30 y.o. female, , who presents for complaints of migraines, severe irritability, feeling angry and depression before and during period.      She states she has experienced this problem for 2 months.  She describes the severity as severe.  She states that the problem is worsening.  The patient reports additional symptoms as none.      Her last LMP was Patient's last menstrual period was 2020 (exact date)..  Periods are regular every 28-30 days, lasting 4-5 days.  Dysmenorrhea:mild, occurring first 1-2 days of flow.  Patient reports problems with: none.  Partner Status: Marital Status: .  New Partners since last visit: no.  Desires STD Screening: no.    Additional OB/GYN History   Current contraception: contraceptive methods: None  Desires to: do not start contraception  Last Pap :   Last Completed Pap Smear       Status Date      PAP SMEAR Done 2020 in [place] (negative; HPV negative)        History of abnormal Pap smear: yes -  HPV non 16/18 positive  Last mammogram:   Last Completed Mammogram     Patient has no health maintenance due at this time        Tobacco Usage?: No   OB History        2    Para   2    Term   2       0    AB   0    Living   2       SAB   0    TAB   0    Ectopic   0    Molar        Multiple   0    Live Births   2                Health Maintenance   Topic Date Due   • Annual Gynecologic Pelvic and Breast Exam  1990   • ANNUAL PHYSICAL  1993   • TDAP/TD VACCINES (1 - Tdap) 2009   • HEPATITIS C SCREENING  10/13/2016   • INFLUENZA VACCINE  2020   • PAP SMEAR  2023   • Pneumococcal Vaccine 0-64  Aged Out       The additional following portions of the patient's history were reviewed and updated as appropriate: allergies, current medications, past family history, past medical history, past social history, past surgical  "history and problem list.    Review of Systems   Constitutional: Negative.    HENT: Negative.    Respiratory: Negative.    Cardiovascular: Negative.    Gastrointestinal: Negative.    Genitourinary: Negative.    Musculoskeletal: Negative.    Skin: Negative.    Allergic/Immunologic: Negative.    Neurological: Positive for headache.   Hematological: Negative.    Psychiatric/Behavioral: Positive for agitation and sleep disturbance.   All other systems reviewed and are negative.      I have reviewed and agree with the HPI, ROS, and historical information as entered above. Marina Mir MD    Objective   /68 (BP Location: Right arm, Patient Position: Sitting, Cuff Size: Adult)   Ht 162.6 cm (64\")   Wt 51.6 kg (113 lb 11.2 oz)   LMP 11/24/2020 (Exact Date)   Breastfeeding No   BMI 19.52 kg/m²     Physical Exam  Constitutional:       Appearance: She is well-developed.   HENT:      Head: Normocephalic.   Eyes:      Conjunctiva/sclera: Conjunctivae normal.   Pulmonary:      Effort: Pulmonary effort is normal.   Psychiatric:         Behavior: Behavior normal.         Assessment/Plan     Encounter Diagnoses   Name Primary?   • PMS (premenstrual syndrome) Yes   • Menstrual migraine, with intractable migraine, so stated, with status migrainosus          Plan    1  PMS symptoms - discussed etiology and options for treatment including hormonal, dietary and antidepressants. Encouraged menstrual calenders to track symptoms and encouraged luteal phase calcium, exercise and dietary changes.  She would like to try MIN even though she has not done well on past with them.  She will know quickly if this pill affects her pill .  Offered trial of SSRI now or if she sees no improvement on pills   2.  Discussed benefits and risks of MIN including headache, nausea and breast tenderness in first several cycles.  Also discussed inherent risks of VTE and increased lifetime risk of breast cancer diagnosis with any hormonal birth " control.  She can expect some BTB in the first 6-12 months of taking a combined oral contraceptive pill continuously.  Encouraged to continue for 3-6 months before changing to a new pill.  3.  Menstrual migraines - hopefully taking pills continuously as we have planned will help with this as well.        Marina Mir MD  12/03/2020

## 2021-11-22 ENCOUNTER — LAB (OUTPATIENT)
Dept: LAB | Facility: HOSPITAL | Age: 31
End: 2021-11-22

## 2021-11-22 ENCOUNTER — TRANSCRIBE ORDERS (OUTPATIENT)
Dept: LAB | Facility: HOSPITAL | Age: 31
End: 2021-11-22

## 2021-11-22 DIAGNOSIS — Z20.828 EXPOSURE TO SARS-ASSOCIATED CORONAVIRUS: Primary | ICD-10-CM

## 2021-11-22 DIAGNOSIS — Z20.828 EXPOSURE TO SARS-ASSOCIATED CORONAVIRUS: ICD-10-CM

## 2021-11-22 PROCEDURE — 86769 SARS-COV-2 COVID-19 ANTIBODY: CPT

## 2021-11-22 PROCEDURE — 36415 COLL VENOUS BLD VENIPUNCTURE: CPT

## 2021-11-24 LAB
SARS-COV-2 AB SERPL IA-ACNC: 38.7 U/ML
SARS-COV-2 AB SERPL-IMP: POSITIVE

## 2021-12-29 ENCOUNTER — TRANSCRIBE ORDERS (OUTPATIENT)
Dept: LAB | Facility: HOSPITAL | Age: 31
End: 2021-12-29

## 2021-12-29 ENCOUNTER — LAB (OUTPATIENT)
Dept: LAB | Facility: HOSPITAL | Age: 31
End: 2021-12-29

## 2021-12-29 DIAGNOSIS — N92.6 IRREGULAR MENSTRUAL CYCLE: ICD-10-CM

## 2021-12-29 DIAGNOSIS — N92.6 IRREGULAR MENSTRUAL CYCLE: Primary | ICD-10-CM

## 2021-12-29 LAB
BASOPHILS # BLD AUTO: 0.04 10*3/MM3 (ref 0–0.2)
BASOPHILS NFR BLD AUTO: 0.6 % (ref 0–1.5)
CHOLEST SERPL-MCNC: 140 MG/DL (ref 0–200)
DEPRECATED RDW RBC AUTO: 36.9 FL (ref 37–54)
EOSINOPHIL # BLD AUTO: 0.1 10*3/MM3 (ref 0–0.4)
EOSINOPHIL NFR BLD AUTO: 1.5 % (ref 0.3–6.2)
ERYTHROCYTE [DISTWIDTH] IN BLOOD BY AUTOMATED COUNT: 11.7 % (ref 12.3–15.4)
ESTRADIOL SERPL HS-MCNC: 32.8 PG/ML
HBA1C MFR BLD: 5.45 % (ref 4.8–5.6)
HCT VFR BLD AUTO: 42.3 % (ref 34–46.6)
HDLC SERPL-MCNC: 64 MG/DL (ref 40–60)
HGB BLD-MCNC: 13.4 G/DL (ref 12–15.9)
IMM GRANULOCYTES # BLD AUTO: 0.01 10*3/MM3 (ref 0–0.05)
IMM GRANULOCYTES NFR BLD AUTO: 0.1 % (ref 0–0.5)
LDLC SERPL CALC-MCNC: 65 MG/DL (ref 0–100)
LDLC/HDLC SERPL: 1.04 {RATIO}
LH SERPL-ACNC: 12.5 MIU/ML
LYMPHOCYTES # BLD AUTO: 0.83 10*3/MM3 (ref 0.7–3.1)
LYMPHOCYTES NFR BLD AUTO: 12.4 % (ref 19.6–45.3)
MCH RBC QN AUTO: 27.2 PG (ref 26.6–33)
MCHC RBC AUTO-ENTMCNC: 31.7 G/DL (ref 31.5–35.7)
MCV RBC AUTO: 85.8 FL (ref 79–97)
MONOCYTES # BLD AUTO: 0.53 10*3/MM3 (ref 0.1–0.9)
MONOCYTES NFR BLD AUTO: 7.9 % (ref 5–12)
NEUTROPHILS NFR BLD AUTO: 5.19 10*3/MM3 (ref 1.7–7)
NEUTROPHILS NFR BLD AUTO: 77.5 % (ref 42.7–76)
NRBC BLD AUTO-RTO: 0 /100 WBC (ref 0–0.2)
PLATELET # BLD AUTO: 361 10*3/MM3 (ref 140–450)
PMV BLD AUTO: 9.7 FL (ref 6–12)
PROGEST SERPL-MCNC: 0.15 NG/ML
PROLACTIN SERPL-MCNC: 7.95 NG/ML (ref 4.79–23.3)
RBC # BLD AUTO: 4.93 10*6/MM3 (ref 3.77–5.28)
TESTOST SERPL-MCNC: 12.7 NG/DL (ref 8.4–48.1)
TRIGL SERPL-MCNC: 48 MG/DL (ref 0–150)
TSH SERPL DL<=0.05 MIU/L-ACNC: 1.2 UIU/ML (ref 0.27–4.2)
VLDLC SERPL-MCNC: 11 MG/DL (ref 5–40)
WBC NRBC COR # BLD: 6.7 10*3/MM3 (ref 3.4–10.8)

## 2021-12-29 PROCEDURE — 84443 ASSAY THYROID STIM HORMONE: CPT

## 2021-12-29 PROCEDURE — 84402 ASSAY OF FREE TESTOSTERONE: CPT

## 2021-12-29 PROCEDURE — 83498 ASY HYDROXYPROGESTERONE 17-D: CPT

## 2021-12-29 PROCEDURE — 84146 ASSAY OF PROLACTIN: CPT

## 2021-12-29 PROCEDURE — 82627 DEHYDROEPIANDROSTERONE: CPT

## 2021-12-29 PROCEDURE — 80061 LIPID PANEL: CPT

## 2021-12-29 PROCEDURE — 83002 ASSAY OF GONADOTROPIN (LH): CPT

## 2021-12-29 PROCEDURE — 83036 HEMOGLOBIN GLYCOSYLATED A1C: CPT

## 2021-12-29 PROCEDURE — 85025 COMPLETE CBC W/AUTO DIFF WBC: CPT

## 2021-12-29 PROCEDURE — 84403 ASSAY OF TOTAL TESTOSTERONE: CPT

## 2021-12-29 PROCEDURE — 83525 ASSAY OF INSULIN: CPT

## 2021-12-29 PROCEDURE — 82670 ASSAY OF TOTAL ESTRADIOL: CPT

## 2021-12-29 PROCEDURE — 36415 COLL VENOUS BLD VENIPUNCTURE: CPT

## 2021-12-29 PROCEDURE — 84144 ASSAY OF PROGESTERONE: CPT

## 2021-12-30 LAB
DHEA-S SERPL-MCNC: 118 UG/DL (ref 84.8–378)
INSULIN SERPL-ACNC: 5.6 UIU/ML (ref 2.6–24.9)

## 2021-12-31 LAB — TESTOST FREE SERPL-MCNC: 1.2 PG/ML (ref 0–4.2)

## 2022-01-05 LAB — 17OHP SERPL-MCNC: 68 NG/DL

## 2023-09-03 PROCEDURE — 87086 URINE CULTURE/COLONY COUNT: CPT

## 2023-09-03 PROCEDURE — 87147 CULTURE TYPE IMMUNOLOGIC: CPT

## 2023-09-05 ENCOUNTER — TELEPHONE (OUTPATIENT)
Dept: URGENT CARE | Facility: CLINIC | Age: 33
End: 2023-09-05
Payer: COMMERCIAL

## 2023-09-05 DIAGNOSIS — A49.1 STREPTOCOCCUS INFECTION, GROUP B: Primary | ICD-10-CM

## 2023-09-05 RX ORDER — CEPHALEXIN 500 MG/1
500 CAPSULE ORAL 2 TIMES DAILY
Qty: 20 CAPSULE | Refills: 0 | Status: SHIPPED | OUTPATIENT
Start: 2023-09-05 | End: 2023-09-05 | Stop reason: SDUPTHER

## 2023-09-05 RX ORDER — CEPHALEXIN 500 MG/1
500 CAPSULE ORAL 2 TIMES DAILY
Qty: 20 CAPSULE | Refills: 0 | Status: SHIPPED | OUTPATIENT
Start: 2023-09-05

## 2024-06-14 PROCEDURE — 87070 CULTURE OTHR SPECIMN AEROBIC: CPT | Performed by: NURSE PRACTITIONER

## 2024-06-14 PROCEDURE — 87205 SMEAR GRAM STAIN: CPT | Performed by: NURSE PRACTITIONER

## 2024-06-14 PROCEDURE — 87185 SC STD ENZYME DETCJ PER NZM: CPT | Performed by: NURSE PRACTITIONER

## 2024-06-14 PROCEDURE — 87077 CULTURE AEROBIC IDENTIFY: CPT | Performed by: NURSE PRACTITIONER

## 2024-06-15 ENCOUNTER — PATIENT ROUNDING (BHMG ONLY) (OUTPATIENT)
Dept: URGENT CARE | Facility: CLINIC | Age: 34
End: 2024-06-15

## 2025-08-09 PROCEDURE — 87205 SMEAR GRAM STAIN: CPT | Performed by: NURSE PRACTITIONER

## 2025-08-09 PROCEDURE — 87186 SC STD MICRODIL/AGAR DIL: CPT | Performed by: NURSE PRACTITIONER

## 2025-08-09 PROCEDURE — 87070 CULTURE OTHR SPECIMN AEROBIC: CPT | Performed by: NURSE PRACTITIONER

## 2025-08-09 PROCEDURE — 87147 CULTURE TYPE IMMUNOLOGIC: CPT | Performed by: NURSE PRACTITIONER

## (undated) DEVICE — SYR ART BLD GAS HEP 1CC

## (undated) DEVICE — GLV SURG BIOGEL LTX PF 6

## (undated) DEVICE — SUT VIC 2/0 CT1 27IN J339H BX/36

## (undated) DEVICE — NDL HYPO ECLPS SFTY 18G 1 1/2IN

## (undated) DEVICE — SUT PLAIN  3/0 CT1 27IN 842H

## (undated) DEVICE — PK C/SECT 10

## (undated) DEVICE — PROXIMATE RH ROTATING HEAD SKIN STAPLERS (35 WIDE) CONTAINS 35 STAINLESS STEEL STAPLES: Brand: PROXIMATE

## (undated) DEVICE — COATED VICRYL  (POLYGLACTIN 910) SUTURE, VIOLET BRAIDED, STERILE, SYNTHETIC ABSORBABLE SUTURE: Brand: COATED VICRYL

## (undated) DEVICE — SUT GUT CHRM 1 CTX 36IN 905H

## (undated) DEVICE — TRY SPINE BLCK WHITACRE 25G 3X5IN

## (undated) DEVICE — SOL IRR NACL 0.9PCT BT 1000ML

## (undated) DEVICE — SOL IRR H2O BTL 1000ML STRL

## (undated) DEVICE — MAT PREVALON MOBL TRANSFR AIR WO/PAD 39X80IN

## (undated) DEVICE — SUT GUT CHRM 2/0 CT1 27IN 811H